# Patient Record
Sex: MALE | Race: WHITE | NOT HISPANIC OR LATINO | Employment: FULL TIME | URBAN - NONMETROPOLITAN AREA
[De-identification: names, ages, dates, MRNs, and addresses within clinical notes are randomized per-mention and may not be internally consistent; named-entity substitution may affect disease eponyms.]

---

## 2017-04-05 ENCOUNTER — OFFICE VISIT (OUTPATIENT)
Dept: FAMILY MEDICINE | Facility: OTHER | Age: 37
End: 2017-04-05
Attending: FAMILY MEDICINE
Payer: COMMERCIAL

## 2017-04-05 VITALS
SYSTOLIC BLOOD PRESSURE: 110 MMHG | HEIGHT: 76 IN | TEMPERATURE: 98.6 F | OXYGEN SATURATION: 99 % | DIASTOLIC BLOOD PRESSURE: 58 MMHG | WEIGHT: 233 LBS | RESPIRATION RATE: 16 BRPM | BODY MASS INDEX: 28.37 KG/M2 | HEART RATE: 80 BPM

## 2017-04-05 DIAGNOSIS — B08.4 HAND, FOOT AND MOUTH DISEASE: ICD-10-CM

## 2017-04-05 DIAGNOSIS — J01.00 ACUTE MAXILLARY SINUSITIS, RECURRENCE NOT SPECIFIED: Primary | ICD-10-CM

## 2017-04-05 DIAGNOSIS — Z71.89 ACP (ADVANCE CARE PLANNING): Chronic | ICD-10-CM

## 2017-04-05 PROCEDURE — 99213 OFFICE O/P EST LOW 20 MIN: CPT | Performed by: FAMILY MEDICINE

## 2017-04-05 ASSESSMENT — PAIN SCALES - GENERAL: PAINLEVEL: NO PAIN (0)

## 2017-04-05 NOTE — PROGRESS NOTES
Abner Marshall    April 5, 2017    Chief Complaint   Patient presents with     URI     cough, headache, sinus pressure, sore throat, chills, body aches since last Tuesday and has had a fever off and on      *_* Health Care Directive *_*     paperwork declined        SUBJECTIVE:  Here for ongoing URI.  Had hand foot mouth, got better, but having worsening URI sx and now bloody purulent mucous and rhinorrhea.  Prominent cough.      History reviewed. No pertinent past medical history.    Past Surgical History:   Procedure Laterality Date     VASECTOMY  2011       Current Outpatient Prescriptions   Medication Sig Dispense Refill     Loratadine (CLARITIN PO) Take 1 tablet by mouth daily       amoxicillin-clavulanate (AUGMENTIN) 875-125 MG per tablet Take 1 tablet by mouth 2 times daily 20 tablet 0       Allergies   Allergen Reactions     Mold      Seasonal Allergies        History reviewed. No pertinent family history.    Social History     Social History     Marital status:      Spouse name: N/A     Number of children: N/A     Years of education: N/A     Occupational History     Not on file.     Social History Main Topics     Smoking status: Never Smoker     Smokeless tobacco: Never Used     Alcohol use Not on file     Drug use: Not on file     Sexual activity: Not on file     Other Topics Concern     Not on file     Social History Narrative     No narrative on file       5 point ROS negative except as noted above in HPI, including Gen., Resp., CV, GI &  system review.     OBJECTIVE:  B/P: 110/58, T: 98.6, P: 80, R: 16    GENERAL APPEARANCE: Alert, no acute distress  HEENT:  Normal entirely.    CV: regular rate and rhythm, no murmur, rub or gallop  RESP: lungs clear to auscultation bilaterally  ABDOMEN: normal bowel sounds, soft, nontender, no hepatosplenomegaly or other masses  SKIN: no suspicious lesions or rashes to visualized skin  NEURO: Alert, oriented x 3, speech and mentation normal    ASSESSMENT and  PLAN:  (J01.00) Acute maxillary sinusitis, recurrence not specified  (primary encounter diagnosis)  Comment: with now bloody rhinorrhea, purulent.   Plan: amoxicillin-clavulanate (AUGMENTIN) 875-125 MG         per tablet        tx with augmentin and f/u with ongoing concerns.     (B08.4) Hand, foot and mouth disease  Comment: resolved.    Plan: follow.

## 2017-04-05 NOTE — MR AVS SNAPSHOT
"              After Visit Summary   2017    Abner Marshall    MRN: [id number]           Patient Information     Date Of Birth          1980        Visit Information        Provider Department      2017 9:30 AM Brad Houser MD [facility]        Today's Diagnoses     Acute maxillary sinusitis, recurrence not specified    -  1    Hand, foot and mouth disease        ACP (advance care planning)          Care Instructions    F/u with ongoing concerns        Follow-ups after your visit        Who to contact     If you have questions or need follow up information about today's clinic visit or your schedule please contact [facility] directly at [phone].  Normal or non-critical lab and imaging results will be communicated to you by MyChart, letter or phone within 4 business days after the clinic has received the results. If you do not hear from us within 7 days, please contact the clinic through MyChart or phone. If you have a critical or abnormal lab result, we will notify you by phone as soon as possible.  Submit refill requests through [employer] or call your pharmacy and they will forward the refill request to us. Please allow 3 business days for your refill to be completed.          Additional Information About Your Visit        MyChart Information     [employer] lets you send messages to your doctor, view your test results, renew your prescriptions, schedule appointments and more. To sign up, go to [email or web address] . Click on \"Log in\" on the left side of the screen, which will take you to the Welcome page. Then click on \"Sign up Now\" on the right side of the page.     You will be asked to enter the access code listed below, as well as some personal information. Please follow the directions to create your username and password.     Your access code is: [email or web address]  Expires: 2017  9:42 AM     Your access code will  in 90 days. If you need help or a new " "code, please call your [facility] clinic or [phone].        Care EveryWhere ID     This is your Care EveryWhere ID. This could be used by other organizations to access your [facility] medical records  [id number]        Your Vitals Were     Pulse Temperature Respirations Height Pulse Oximetry BMI (Body Mass Index)    80 98.6  F (37  C) (Tympanic) 16 6' 3.5\" (1.918 m) 99% 28.74 kg/m2       Blood Pressure from Last 3 Encounters:   04/05/17 110/58    Weight from Last 3 Encounters:   04/05/17 233 lb (105.7 kg)              Today, you had the following     No orders found for display         Today's Medication Changes          These changes are accurate as of: 4/5/17  9:42 AM.  If you have any questions, ask your nurse or doctor.               Start taking these medicines.        Dose/Directions    amoxicillin-clavulanate 875-125 MG per tablet   Commonly known as:  AUGMENTIN   Used for:  Acute maxillary sinusitis, recurrence not specified   Started by:  Brad Houser MD        Dose:  1 tablet   Take 1 tablet by mouth 2 times daily   Quantity:  20 tablet   Refills:  0            Where to get your medicines      These medications were sent to [id number] - [place], [place] - [address] [address] [id number] [address], [place] [address]     Phone:  [phone]     amoxicillin-clavulanate 875-125 MG per tablet                Primary Care Provider    None Specified       No primary provider on file.        Thank you!     Thank you for choosing [facility]  for your care. Our goal is always to provide you with excellent care. Hearing back from our patients is one way we can continue to improve our services. Please take a few minutes to complete the written survey that you may receive in the mail after your visit with us. Thank you!             Your Updated Medication List - Protect others around you: Learn how to safely use, store and throw away your medicines at www.disposemymeds.org.          This list is " accurate as of: 4/5/17  9:42 AM.  Always use your most recent med list.                   Brand Name Dispense Instructions for use    amoxicillin-clavulanate 875-125 MG per tablet    AUGMENTIN    20 tablet    Take 1 tablet by mouth 2 times daily       CLARITIN PO      Take 1 tablet by mouth daily

## 2017-04-05 NOTE — NURSING NOTE
"Chief Complaint   Patient presents with     URI     cough, headache, sinus pressure, sore throat, chills, body aches since last Tuesday and has had a fever off and on      *_* Health Care Directive *_*     paperwork declined        Initial /58 (BP Location: Right arm, Patient Position: Chair, Cuff Size: Adult Large)  Pulse 80  Temp 98.6  F (37  C) (Tympanic)  Resp 16  Ht 6' 3.5\" (1.918 m)  Wt 233 lb (105.7 kg)  SpO2 99%  BMI 28.74 kg/m2 Estimated body mass index is 28.74 kg/(m^2) as calculated from the following:    Height as of this encounter: 6' 3.5\" (1.918 m).    Weight as of this encounter: 233 lb (105.7 kg).  Medication Reconciliation: complete   Roma Pena LPN      "

## 2019-02-05 NOTE — PROGRESS NOTES
SUBJECTIVE:   Abner Marshall is a 38 year old male who presents to clinic today for the following health issues:      RESPIRATORY SYMPTOMS;   Sinus Problem      Duration: 2 months    Description  nasal congestion, facial pain/pressure, ear pain left, headache, hoarse voice, nausea and loss of appetite, Ocean sound in ears    Severity: moderate    Accompanying signs and symptoms: see above    History (predisposing factors):  none    Precipitating or alleviating factors: None    Therapies tried and outcome:  rest and fluids antihistamine ; Claritin D; helpful not effective.             Problem list and histories reviewed & adjusted, as indicated.  Additional history: as documented    Patient Active Problem List   Diagnosis     ACP (advance care planning)     Past Surgical History:   Procedure Laterality Date     VASECTOMY  2011       Social History     Tobacco Use     Smoking status: Never Smoker     Smokeless tobacco: Never Used   Substance Use Topics     Alcohol use: No     Family History   Problem Relation Age of Onset     Obesity Mother      Ear Disorder Mother         Chronic ear disease         Current Outpatient Medications   Medication Sig Dispense Refill     amoxicillin-clavulanate (AUGMENTIN) 875-125 MG tablet Take 1 tablet by mouth 2 times daily for 14 days 28 tablet 0     Loratadine (CLARITIN PO) Take 1 tablet by mouth daily       Allergies   Allergen Reactions     Mold      Seasonal Allergies        Reviewed and updated as needed this visit by clinical staff  Tobacco  Allergies  Meds  Med Hx  Surg Hx  Fam Hx  Soc Hx      Reviewed and updated as needed this visit by Provider         ROS:  Constitutional, HEENT, cardiovascular, pulmonary, gi and gu systems are negative, except as otherwise noted.    OBJECTIVE:                                                    BP 98/58 (BP Location: Right arm, Patient Position: Sitting, Cuff Size: Adult Large)   Pulse 70   Temp 97.6  F (36.4  C) (Tympanic)   Ht  "1.905 m (6' 3\")   Wt 108 kg (238 lb)   SpO2 97%   BMI 29.75 kg/m    Body mass index is 29.75 kg/m .  General: Alert, oriented. In NAD  Skin: warm and dry. No suspicious rash, lesions.  HEENT: EAC's and TM's intact. Posterior pharynx moist and pink without edema or exudate. Nasal mucosa edematous, erythematous. Maxillary sinuses TTP.Neck is supple. No lymphadenopathy.  Resp: Lungs CTA without wheeze, rale or rhonchi.  Cardiac: RRR. Normal S1, S2. No murmur.  Psych. Mood euthymic with corresponding affect           ASSESSMENT/PLAN:                                                    1. Sinusitis, unspecified chronicity, unspecified location  - amoxicillin-clavulanate (AUGMENTIN) 875-125 MG tablet; Take 1 tablet by mouth 2 times daily for 14 days  Dispense: 28 tablet; Refill: 0    2. Need for diphtheria-tetanus-pertussis (Tdap) vaccine  - TDAP VACCINE (ADACEL)  - VACCINE ADMINISTRATION, INITIAL      Rest, increase fluids, Tylenol for fever or discomfort. Return to clinic if symptoms persist or worsen.      JIM Urbina  [facility]  "

## 2019-02-06 ENCOUNTER — OFFICE VISIT (OUTPATIENT)
Dept: FAMILY MEDICINE | Facility: OTHER | Age: 39
End: 2019-02-06
Attending: PHYSICIAN ASSISTANT

## 2019-02-06 VITALS
HEIGHT: 75 IN | SYSTOLIC BLOOD PRESSURE: 98 MMHG | WEIGHT: 238 LBS | TEMPERATURE: 97.6 F | BODY MASS INDEX: 29.59 KG/M2 | HEART RATE: 70 BPM | DIASTOLIC BLOOD PRESSURE: 58 MMHG | OXYGEN SATURATION: 97 %

## 2019-02-06 DIAGNOSIS — J32.9 SINUSITIS, UNSPECIFIED CHRONICITY, UNSPECIFIED LOCATION: ICD-10-CM

## 2019-02-06 DIAGNOSIS — Z23 NEED FOR DIPHTHERIA-TETANUS-PERTUSSIS (TDAP) VACCINE: Primary | ICD-10-CM

## 2019-02-06 PROCEDURE — 99213 OFFICE O/P EST LOW 20 MIN: CPT | Mod: 25 | Performed by: PHYSICIAN ASSISTANT

## 2019-02-06 PROCEDURE — 90471 IMMUNIZATION ADMIN: CPT | Performed by: PHYSICIAN ASSISTANT

## 2019-02-06 PROCEDURE — 90715 TDAP VACCINE 7 YRS/> IM: CPT | Performed by: PHYSICIAN ASSISTANT

## 2019-02-06 ASSESSMENT — MIFFLIN-ST. JEOR: SCORE: 2095.19

## 2019-02-06 ASSESSMENT — PAIN SCALES - GENERAL: PAINLEVEL: MODERATE PAIN (4)

## 2019-02-06 NOTE — NURSING NOTE
"Chief Complaint   Patient presents with     URI     Sinus       Initial BP 98/58 (BP Location: Right arm, Patient Position: Sitting, Cuff Size: Adult Large)   Pulse 70   Temp 97.6  F (36.4  C) (Tympanic)   Ht 1.905 m (6' 3\")   Wt 108 kg (238 lb)   SpO2 97%   BMI 29.75 kg/m   Estimated body mass index is 29.75 kg/m  as calculated from the following:    Height as of this encounter: 1.905 m (6' 3\").    Weight as of this encounter: 108 kg (238 lb).  Medication Reconciliation: complete    Paulina Morfin LPN  "

## 2019-10-28 ENCOUNTER — HOSPITAL ENCOUNTER (EMERGENCY)
Facility: HOSPITAL | Age: 39
Discharge: HOME | End: 2019-10-28
Attending: PHYSICIAN ASSISTANT | Admitting: PHYSICIAN ASSISTANT
Payer: COMMERCIAL

## 2019-10-28 ENCOUNTER — APPOINTMENT (OUTPATIENT)
Dept: GENERAL RADIOLOGY | Facility: HOSPITAL | Age: 39
End: 2019-10-28
Attending: PHYSICIAN ASSISTANT
Payer: COMMERCIAL

## 2019-10-28 VITALS
OXYGEN SATURATION: 98 % | RESPIRATION RATE: 18 BRPM | SYSTOLIC BLOOD PRESSURE: 133 MMHG | WEIGHT: 230 LBS | BODY MASS INDEX: 28.75 KG/M2 | TEMPERATURE: 98.3 F | DIASTOLIC BLOOD PRESSURE: 82 MMHG

## 2019-10-28 DIAGNOSIS — S02.2XXA CLOSED FRACTURE OF NASAL BONE, INITIAL ENCOUNTER: Primary | ICD-10-CM

## 2019-10-28 PROCEDURE — 70150 X-RAY EXAM OF FACIAL BONES: CPT | Mod: TC

## 2019-10-28 PROCEDURE — G0463 HOSPITAL OUTPT CLINIC VISIT: HCPCS

## 2019-10-28 PROCEDURE — 99213 OFFICE O/P EST LOW 20 MIN: CPT | Mod: Z6 | Performed by: PHYSICIAN ASSISTANT

## 2019-10-28 ASSESSMENT — ENCOUNTER SYMPTOMS
SINUS PAIN: 0
DIARRHEA: 0
SORE THROAT: 0
VOMITING: 0
NECK PAIN: 0
FEVER: 0
NAUSEA: 0

## 2019-10-28 NOTE — ED TRIAGE NOTES
Pt presents with a reddened,swollen and painful nose since today when he got hit in the nose with a log.

## 2019-10-28 NOTE — ED PROVIDER NOTES
History     Chief Complaint   Patient presents with     Facial Injury     HPI  Abner Marshall is a 39 year old male who presents to urgent care for evaluation of nose.  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  He states that he noted a disfigurement of his nose and bleeding immediately.  Patient denies any loss of consciousness, malalignment of teeth, vision changes, nausea, vomiting, hearing changes or any other associated symptom.  Patient has not taken anything over-the-counter.    Allergies:  Allergies   Allergen Reactions     Mold      Seasonal Allergies        Problem List:    Patient Active Problem List    Diagnosis Date Noted     ACP (advance care planning) 04/05/2017     Priority: Medium     Advance Care Planning 4/5/2017: ACP Review of Chart / Resources Provided:  Reviewed chart for advance care plan.  Abner Marshall has no plan or code status on file. Discussed available resources and provided with information.   Added by Roma Pena                Past Medical History:    No past medical history on file.    Past Surgical History:    Past Surgical History:   Procedure Laterality Date     VASECTOMY  2011       Family History:    Family History   Problem Relation Age of Onset     Obesity Mother      Ear Disorder Mother         Chronic ear disease       Social History:  Marital Status:   [2]  Social History     Tobacco Use     Smoking status: Never Smoker     Smokeless tobacco: Never Used   Substance Use Topics     Alcohol use: No     Drug use: No        Medications:    Loratadine (CLARITIN PO)          Review of Systems   Constitutional: Negative for fever.   HENT: Positive for nosebleeds. Negative for sinus pain, sneezing and sore throat.    Eyes: Negative for visual disturbance.   Gastrointestinal: Negative for diarrhea, nausea and vomiting.   Musculoskeletal: Negative for neck pain.       Physical Exam   BP: 133/82  Heart  Rate: 67  Temp: 98.3  F (36.8  C)  Resp: 18  Weight: 104.3 kg (230 lb)  SpO2: 98 %      Physical Exam  Vitals signs and nursing note reviewed.   Constitutional:       Appearance: He is not ill-appearing.   HENT:      Head: Normocephalic.      Right Ear: Tympanic membrane normal.      Left Ear: Tympanic membrane normal.      Nose: Septal deviation and nasal tenderness present.      Right Nostril: Epistaxis present. No septal hematoma.      Left Nostril: No septal hematoma.      Right Sinus: No maxillary sinus tenderness or frontal sinus tenderness.      Left Sinus: No maxillary sinus tenderness or frontal sinus tenderness.   Eyes:      Extraocular Movements: Extraocular movements intact.      Conjunctiva/sclera: Conjunctivae normal.      Pupils: Pupils are equal, round, and reactive to light.   Cardiovascular:      Rate and Rhythm: Regular rhythm.      Heart sounds: Normal heart sounds.   Pulmonary:      Breath sounds: Normal breath sounds.   Neurological:      Mental Status: He is alert.         ED Course        Procedures              Critical Care time:               Results for orders placed or performed during the hospital encounter of 10/28/19 (from the past 24 hour(s))   XR Facial Bone G/E 3 Views    Narrative    PROCEDURE: XR FACIAL BONE G/E 3 VW 10/28/2019 2:25 PM    HISTORY: trauma to nose    COMPARISONS: None.    TECHNIQUE: 3 views    FINDINGS: There is a nasal bone fracture seen. The fracture does not  appear significantly displaced. There is deviation of the nasal septum  to the left. The paranasal sinuses are clear. The maxillary spine is  intact.         Impression    IMPRESSION: Nasal bone fracture    LARS PALACIOS MD       Medications - No data to display    Assessments & Plan (with Medical Decision Making)   #1.  Nasal fracture septal deviation    Discussed exam findings with patient.  Patient has referral to ENT placed.  Appropriate icing along with rotating Tylenol and ibuprofen as discussed  with patient.  We also discussed limiting activity over the next few days to decrease chance of recurrent epistaxis.  Any concerns in the meantime please return to urgency department.  Patient verbalizes understanding and agreement of plan.        I have reviewed the nursing notes.    I have reviewed the findings, diagnosis, plan and need for follow up with the patient.      Discharge Medication List as of 10/28/2019  3:15 PM          Final diagnoses:   Closed fracture of nasal bone, initial encounter       10/28/2019   [facility]     Godfrey Bronson PA-C  10/28/19 [id number]

## 2019-10-29 NOTE — PROGRESS NOTES
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  He noticed bleeding and a change in appearance of the nose    He denies prior nasal surgery or prior nasal trauma     He was seen in the emergency room by VICTORIA FERNANDEZ who contacted me and is here for consultation      There is no CT facial bones but There Is an X-Ray of the Facial Bones Which Was Reviewed and Shows a Comminuted Nasal Bone Fracture with Slight Displacement and a Septal Deviation to the Left      He feels he is starting to breathe better out of his nose today.  No complaints of nasal obstruction no further epistaxis he denies pain    He also has noticed over the past year that his allergies seem to be progressively worsening.  His symptoms include rhinorrhea associated with eye drainage, ocular pruritus and recurrent sneezing.  He denies chronic sinusitis or chronic congestion.    He does snore but denies carmen sleep apnea or chronic daytime somnolence.  His wife Flakita does have concerns regarding hypersomnolence and heroic snoring  No history of heart disease hypertension no family history of significant heart disease early death or stroke  There is a significant family history of allergies and snoring    Current Outpatient Rx   Medication Sig Dispense Refill     Loratadine (CLARITIN PO) Take 1 tablet by mouth daily         Allergies: Mold and Seasonal allergies     History reviewed. No pertinent past medical history.    Past Surgical History:   Procedure Laterality Date     VASECTOMY         ENT family history reviewed    Social History     Tobacco Use     Smoking status: Never Smoker     Smokeless tobacco: Never Used   Substance Use Topics     Alcohol use: No      "Drug use: No       Review of Systems  ROS: 10 point ROS neg other than the symptoms noted above in the HPI and occasional imbalance    Physical Exam  /72   Pulse 82   Temp 96.8  F (36  C) (Tympanic)   Ht 1.905 m (6' 3\")   Wt 104.3 kg (230 lb)   SpO2 97%   BMI 28.75 kg/m    General - The patient is well nourished and well developed, and appears to have good nutritional status.  Alert and oriented to person and place, answers questions and cooperates with examination appropriately.   No telecanthus.  Jaw opening is adequate no palpable orbital step-off fracture  Head and Face - Normocephalic and atraumatic, with no gross asymmetry noted.  The facial nerve is intact, with strong symmetric movements.  Voice and Breathing - The patient was breathing comfortably without the use of accessory muscles. There was no wheezing, stridor, or stertor.  The patients voice was clear and strong, and had appropriate pitch and quality.  No carmen peripheral digital clubbing or cyanosis   Ears -The external auditory canals are patent, the tympanic membranes are intact without effusion, retraction or mass.  Bony landmarks are intact.  Eyes - Extraocular movements intact, and the pupils were reactive to light.  Sclera were not icteric or injected, conjunctiva were pink and moist.  Mouth - Examination of the oral cavity showed pink, healthy oral mucosa. No lesions or ulcerations noted.  The tongue was mobile and midline, and the dentition were in good condition.    Throat - The walls of the oropharynx were smooth, pink, moist, symmetric, and had no lesions or ulcerations.  The tonsillar pillars and soft palate were symmetric.  The uvula was midline on elevation.  Akhtar Tongue Position III, grade 2 tonsils, small pinpoint uvular papilloma on the left  Neck - No palpable enlarged fixed cervical lymph nodes.  No neck cysts or unusual tenderness to palpation.   No palpable fixed thyroid nodules or concerning goiter.  The trachea " is grossly midline.   Nose - External contour is asymmetric with a palpable and visible asymmetry at the cephalad nasal bone to the left.   no scars.  Nasal mucosa is pink and moist with no abnormal mucus.  Tip grossly symmetric.  Photos taken. The septum and turbinates were evaluated: DNS left with 60% obstruction, moderate  inferior turbinate hypertrophy bilaterally.  No polyps, masses, or purulence noted on examination.    To evaluate the nose and sinuses, I performed rigid nasal endoscopy. The LPN had previously sprayed both nares with lidocaine and neosynephrine.    I began with the LEFT side using a 0 degree rigid nasal endoscope, and then similarly examined the RIGHT side    Findings:  Inferior turbinates:  edematous  Middle turbinate and middle meatus:  No purulence, no polyposis  Mucosa is healthy throughout,  no polyps nor polypoid degeneration  DNS left mid to superior septum   no septal hematoma no clear rhinorrhea  The patient tolerated the procedure well    Impression and Plan- Abner Marshall is a 39 year old male with:    ICD-10-CM    1. Perennial allergic rhinitis J30.89 fluticasone (FLONASE) 50 MCG/ACT nasal spray     desloratadine (CLARINEX) 5 MG tablet   2. Closed fracture of nasal bone, initial encounter S02.2XXA    3. DNS (deviated nasal septum) J34.2    4. Nasal turbinate hypertrophy J34.3    5. Primary snoring R06.83    6. 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  He only is concerned that he is breathing out of his nose and he feels he is breathing well.  I did discuss close reduction nasal bone fracture with turbinate reduction.  At this point he is not interested in surgery and there is no absolute indication for nasal surgery.    His septal deviation is most likely long-standing there are no septal abrasions or ecchymosis    He can certainly proceed with office turbinate reduction in the future if  necessary this was also discussed    Symptoms of sleep apnea were discussed and  if carmen apnea develops he should certainly have a sleep study.  At this point he feels he is sleeping well there is some disagreement between he and his wife about daytime somnolence.  If he decides he would like to have a sleep study and certainly happy to order this however there is no obesity hypertension or other obvious risk factors other than his oropharynx anatomy that would lead me to order a sleep study at this juncture    Complete Allergy Skin Testing  Start Clarinex (depending on insurance)  Stop Claritin  Start Flonase in 1 week  Use ocean spray until that time    Indications for allergy testing include:   1) Confirm suspicion of allergic rhinitis due to inhalant allergies  2) Identify the offending allergen to determine specific mode of treatment  3) In the case of chronic rhinosinusitis: when symptoms are not controlled by avoidance and pharmacotherapy  4) In the Asthma patient when exacerbations may be due to perennial allergen exposure  5) Suspect food allergy  6) Otitis Media, chronic rhinitis, atopic dermatitis, Meniere disease, headache, pharyngitis or eye symptoms    Modified quantitative testing (MQT) will be performed.  Signed consent was obtained, and the risks of immunotherapy were discussed, including the potential for anaphylaxis.    If immunotherapy (IT) is recommended, there is continued risk of anaphylaxis.   Anaphylaxis can cause death. The patient will need to be monitored for 30 minutes post injection.  They must present their epinephrine pen prior to injection.  Subcutaneous as well as sublingual immunotherapy (SLIT) were discussed as potential treatment options.  The patient was told SLIT is not approved by the FDA and is cash pay.  The general time frame of immunotherapy was discussed (generally 3-5 years, sometimes longer), and the basic immunology behind IT was discussed.        Iris LOUISE  DARIAN Simpson.  Otolaryngology/Head and Neck Surgery  Allergy

## 2019-10-30 ENCOUNTER — OFFICE VISIT (OUTPATIENT)
Dept: OTOLARYNGOLOGY | Facility: OTHER | Age: 39
End: 2019-10-30
Attending: OTOLARYNGOLOGY
Payer: COMMERCIAL

## 2019-10-30 VITALS
TEMPERATURE: 96.8 F | SYSTOLIC BLOOD PRESSURE: 110 MMHG | HEIGHT: 75 IN | HEART RATE: 82 BPM | OXYGEN SATURATION: 97 % | WEIGHT: 230 LBS | DIASTOLIC BLOOD PRESSURE: 72 MMHG | BODY MASS INDEX: 28.6 KG/M2

## 2019-10-30 DIAGNOSIS — R06.83 PRIMARY SNORING: ICD-10-CM

## 2019-10-30 DIAGNOSIS — J34.2 DNS (DEVIATED NASAL SEPTUM): ICD-10-CM

## 2019-10-30 DIAGNOSIS — J34.3 NASAL TURBINATE HYPERTROPHY: ICD-10-CM

## 2019-10-30 DIAGNOSIS — S02.2XXA CLOSED FRACTURE OF NASAL BONE, INITIAL ENCOUNTER: ICD-10-CM

## 2019-10-30 DIAGNOSIS — J30.89 PERENNIAL ALLERGIC RHINITIS: Primary | ICD-10-CM

## 2019-10-30 DIAGNOSIS — K13.79 HYPERTROPHIC SOFT PALATE: ICD-10-CM

## 2019-10-30 PROCEDURE — 99204 OFFICE O/P NEW MOD 45 MIN: CPT | Mod: 25 | Performed by: OTOLARYNGOLOGY

## 2019-10-30 PROCEDURE — 31231 NASAL ENDOSCOPY DX: CPT | Performed by: OTOLARYNGOLOGY

## 2019-10-30 RX ORDER — DESLORATADINE 5 MG/1
5 TABLET ORAL DAILY
Qty: 60 TABLET | Refills: 12 | Status: SHIPPED | OUTPATIENT
Start: 2019-10-30 | End: 2020-10-22

## 2019-10-30 RX ORDER — FLUTICASONE PROPIONATE 50 MCG
2 SPRAY, SUSPENSION (ML) NASAL DAILY
Qty: 16 G | Refills: 12 | Status: SHIPPED | OUTPATIENT
Start: 2019-10-30 | End: 2020-01-07

## 2019-10-30 ASSESSMENT — PAIN SCALES - GENERAL: PAINLEVEL: NO PAIN (0)

## 2019-10-30 ASSESSMENT — MIFFLIN-ST. JEOR: SCORE: 2053.9

## 2019-10-30 NOTE — PATIENT INSTRUCTIONS
Thank you for allowing Dr. Simpson and our ENT team to participate in your care.  If your medications are too expensive, please give the nurse a call.  We can possibly change this medication.  If you have a scheduling or an appointment question please contact our Health Unit Coordinator at their direct line [phone].   ALL nursing questions or concerns can be directed to your ENT nurse at: [phone] - Stephanie    Complete Allergy Skin Testing  Start Clarinex (depending on insurance)  Stop Claritin  Start Flonase in 1 week    Indications for allergy testing include:   1) Confirm suspicion of allergic rhinitis due to inhalant allergies  2) Identify the offending allergen to determine specific mode of treatment  3) In the case of chronic rhinosinusitis: when symptoms are not controlled by avoidance and pharmacotherapy  4) In the Asthma patient when exacerbations may be due to perennial allergen exposure  5) Suspect food allergy  6) Otitis Media, chronic rhinitis, atopic dermatitis, Meniere disease, headache, pharyngitis or eye symptoms    Modified quantitative testing (MQT) will be performed.  Signed consent was obtained, and the risks of immunotherapy were discussed, including the potential for anaphylaxis.    If immunotherapy (IT) is recommended, there is continued risk of anaphylaxis.   Anaphylaxis can cause death. The patient will need to be monitored for 30 minutes post injection.  They must present their epinephrine pen prior to injection.  Subcutaneous as well as sublingual immunotherapy (SLIT) were discussed as potential treatment options.  The patient was told SLIT is not approved by the FDA and is cash pay.  The general time frame of immunotherapy was discussed (generally 3-5 years, sometimes longer), and the basic immunology behind IT was discussed.

## 2019-10-30 NOTE — NURSING NOTE
"Chief Complaint   Patient presents with     Consult     Nasal Fracture; Referred by Caio Bronson in the ER       Initial /72   Pulse 82   Temp 96.8  F (36  C) (Tympanic)   Ht 1.905 m (6' 3\")   Wt 104.3 kg (230 lb)   SpO2 97%   BMI 28.75 kg/m   Estimated body mass index is 28.75 kg/m  as calculated from the following:    Height as of this encounter: 1.905 m (6' 3\").    Weight as of this encounter: 104.3 kg (230 lb).  Medication Reconciliation: complete  Myrna Ferrer LPN  "

## 2019-10-30 NOTE — LETTER
10/30/2019         RE: Abner Marshall  [address]  [facility] [address]        Dear Colleague,    Thank you for referring your patient, Abner Marshall, to the [facility]. Please see a copy of my visit note below.    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  He noticed bleeding and a change in appearance of the nose    He denies prior nasal surgery or prior nasal trauma     He was seen in the emergency room by VICTORIA FERNANDEZ who contacted me and is here for consultation      There is no CT facial bones but There Is an X-Ray of the Facial Bones Which Was Reviewed and Shows a Comminuted Nasal Bone Fracture with Slight Displacement and a Septal Deviation to the Left      He feels he is starting to breathe better out of his nose today.  No complaints of nasal obstruction no further epistaxis he denies pain    He also has noticed over the past year that his allergies seem to be progressively worsening.  His symptoms include rhinorrhea associated with eye drainage, ocular pruritus and recurrent sneezing.  He denies chronic sinusitis or chronic congestion.    He does snore but denies carmen sleep apnea or chronic daytime somnolence.  His wife Flakita does have concerns regarding hypersomnolence and heroic snoring  No history of heart disease hypertension no family history of significant heart disease early death or stroke  There is a significant family history of allergies and snoring    Current Outpatient Rx   Medication Sig Dispense Refill     Loratadine (CLARITIN PO) Take 1 tablet by mouth daily         Allergies: Mold and Seasonal allergies     History reviewed. No pertinent past medical  "history.    Past Surgical History:   Procedure Laterality Date     VASECTOMY  2011       ENT family history reviewed    Social History     Tobacco Use     Smoking status: Never Smoker     Smokeless tobacco: Never Used   Substance Use Topics     Alcohol use: No     Drug use: No       Review of Systems  ROS: 10 point ROS neg other than the symptoms noted above in the HPI and occasional imbalance    Physical Exam  /72   Pulse 82   Temp 96.8  F (36  C) (Tympanic)   Ht 1.905 m (6' 3\")   Wt 104.3 kg (230 lb)   SpO2 97%   BMI 28.75 kg/m     General - The patient is well nourished and well developed, and appears to have good nutritional status.  Alert and oriented to person and place, answers questions and cooperates with examination appropriately.   No telecanthus.  Jaw opening is adequate no palpable orbital step-off fracture  Head and Face - Normocephalic and atraumatic, with no gross asymmetry noted.  The facial nerve is intact, with strong symmetric movements.  Voice and Breathing - The patient was breathing comfortably without the use of accessory muscles. There was no wheezing, stridor, or stertor.  The patients voice was clear and strong, and had appropriate pitch and quality.  No carmen peripheral digital clubbing or cyanosis   Ears -The external auditory canals are patent, the tympanic membranes are intact without effusion, retraction or mass.  Bony landmarks are intact.  Eyes - Extraocular movements intact, and the pupils were reactive to light.  Sclera were not icteric or injected, conjunctiva were pink and moist.  Mouth - Examination of the oral cavity showed pink, healthy oral mucosa. No lesions or ulcerations noted.  The tongue was mobile and midline, and the dentition were in good condition.    Throat - The walls of the oropharynx were smooth, pink, moist, symmetric, and had no lesions or ulcerations.  The tonsillar pillars and soft palate were symmetric.  The uvula was midline on elevation.  " Akhtar Tongue Position III, grade 2 tonsils, small pinpoint uvular papilloma on the left  Neck - No palpable enlarged fixed cervical lymph nodes.  No neck cysts or unusual tenderness to palpation.   No palpable fixed thyroid nodules or concerning goiter.  The trachea is grossly midline.   Nose - External contour is asymmetric with a palpable and visible asymmetry at the cephalad nasal bone to the left.   no scars.  Nasal mucosa is pink and moist with no abnormal mucus.  Tip grossly symmetric.  Photos taken. The septum and turbinates were evaluated: DNS left with 60% obstruction, moderate  inferior turbinate hypertrophy bilaterally.  No polyps, masses, or purulence noted on examination.    To evaluate the nose and sinuses, I performed rigid nasal endoscopy. The LPN had previously sprayed both nares with lidocaine and neosynephrine.    I began with the LEFT side using a 0 degree rigid nasal endoscope, and then similarly examined the RIGHT side    Findings:  Inferior turbinates:  edematous  Middle turbinate and middle meatus:  No purulence, no polyposis  Mucosa is healthy throughout,  no polyps nor polypoid degeneration  DNS left mid to superior septum   no septal hematoma no clear rhinorrhea  The patient tolerated the procedure well    Impression and Plan- Abner Marshall is a 39 year old male with:    ICD-10-CM    1. Perennial allergic rhinitis J30.89 fluticasone (FLONASE) 50 MCG/ACT nasal spray     desloratadine (CLARINEX) 5 MG tablet   2. Closed fracture of nasal bone, initial encounter S02.2XXA    3. DNS (deviated nasal septum) J34.2    4. Nasal turbinate hypertrophy J34.3    5. Primary snoring R06.83    6. 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  He only is concerned that he is breathing out of his nose and he feels he is breathing well.  I did discuss close reduction nasal bone fracture with turbinate reduction.  At this point  he is not interested in surgery and there is no absolute indication for nasal surgery.    His septal deviation is most likely long-standing there are no septal abrasions or ecchymosis    He can certainly proceed with office turbinate reduction in the future if necessary this was also discussed    Symptoms of sleep apnea were discussed and  if carmen apnea develops he should certainly have a sleep study.  At this point he feels he is sleeping well there is some disagreement between he and his wife about daytime somnolence.  If he decides he would like to have a sleep study and certainly happy to order this however there is no obesity hypertension or other obvious risk factors other than his oropharynx anatomy that would lead me to order a sleep study at this juncture    Complete Allergy Skin Testing  Start Clarinex (depending on insurance)  Stop Claritin  Start Flonase in 1 week  Use ocean spray until that time    Indications for allergy testing include:   1) Confirm suspicion of allergic rhinitis due to inhalant allergies  2) Identify the offending allergen to determine specific mode of treatment  3) In the case of chronic rhinosinusitis: when symptoms are not controlled by avoidance and pharmacotherapy  4) In the Asthma patient when exacerbations may be due to perennial allergen exposure  5) Suspect food allergy  6) Otitis Media, chronic rhinitis, atopic dermatitis, Meniere disease, headache, pharyngitis or eye symptoms    Modified quantitative testing (MQT) will be performed.  Signed consent was obtained, and the risks of immunotherapy were discussed, including the potential for anaphylaxis.    If immunotherapy (IT) is recommended, there is continued risk of anaphylaxis.   Anaphylaxis can cause death. The patient will need to be monitored for 30 minutes post injection.  They must present their epinephrine pen prior to injection.  Subcutaneous as well as sublingual immunotherapy (SLIT) were discussed as potential  treatment options.  The patient was told SLIT is not approved by the FDA and is cash pay.  The general time frame of immunotherapy was discussed (generally 3-5 years, sometimes longer), and the basic immunology behind IT was discussed.        Iris Simpson D.O.  Otolaryngology/Head and Neck Surgery  Allergy      Again, thank you for allowing me to participate in the care of your patient.        Sincerely,        Iris Simpson MD

## 2020-01-07 ENCOUNTER — OFFICE VISIT (OUTPATIENT)
Dept: ALLERGY | Facility: OTHER | Age: 40
End: 2020-01-07
Attending: PHYSICIAN ASSISTANT
Payer: COMMERCIAL

## 2020-01-07 ENCOUNTER — OFFICE VISIT (OUTPATIENT)
Dept: OTOLARYNGOLOGY | Facility: OTHER | Age: 40
End: 2020-01-07
Attending: PHYSICIAN ASSISTANT
Payer: COMMERCIAL

## 2020-01-07 VITALS
BODY MASS INDEX: 30.5 KG/M2 | TEMPERATURE: 98 F | OXYGEN SATURATION: 97 % | WEIGHT: 244 LBS | SYSTOLIC BLOOD PRESSURE: 129 MMHG | HEART RATE: 82 BPM | DIASTOLIC BLOOD PRESSURE: 74 MMHG

## 2020-01-07 DIAGNOSIS — J34.3 NASAL TURBINATE HYPERTROPHY: ICD-10-CM

## 2020-01-07 DIAGNOSIS — J30.9 ALLERGIC RHINITIS: Primary | ICD-10-CM

## 2020-01-07 DIAGNOSIS — J30.89 PERENNIAL ALLERGIC RHINITIS: Primary | ICD-10-CM

## 2020-01-07 DIAGNOSIS — R06.83 PRIMARY SNORING: ICD-10-CM

## 2020-01-07 DIAGNOSIS — J34.2 DNS (DEVIATED NASAL SEPTUM): ICD-10-CM

## 2020-01-07 PROCEDURE — 99213 OFFICE O/P EST LOW 20 MIN: CPT | Mod: 25 | Performed by: PHYSICIAN ASSISTANT

## 2020-01-07 PROCEDURE — 95004 PERQ TESTS W/ALRGNC XTRCS: CPT

## 2020-01-07 PROCEDURE — 95024 IQ TESTS W/ALLERGENIC XTRCS: CPT

## 2020-01-07 RX ORDER — DESLORATADINE 5 MG/1
5 TABLET ORAL DAILY
Qty: 90 TABLET | Refills: 3 | Status: SHIPPED | OUTPATIENT
Start: 2020-01-07 | End: 2020-10-22

## 2020-01-07 ASSESSMENT — PAIN SCALES - GENERAL: PAINLEVEL: NO PAIN (0)

## 2020-01-07 NOTE — PATIENT INSTRUCTIONS
Work on allergy avoidance.   Continue with Flonase.   Rinse 1-2 times a day as needed.   Sent in Clarinex. Take one tablet daily    If no improvement- consider allergy therapy.     Thank you for allowing Paulina Sanders PA-C and our ENT team to participate in your care.  If your medications are too expensive, please give the nurse a call.  We can possibly change this medication.  If you have a scheduling or an appointment question please contact our Health Unit Coordinator at their direct line [phone].   ALL nursing questions or concerns can be directed to your ENT nurse at: [phone] Mary

## 2020-01-07 NOTE — LETTER
1/7/2020         RE: Abner Marshall  [address]  [facility] [address]        Dear Colleague,    Thank you for referring your patient, Abner Marshall, to the [facility]. Please see a copy of my visit note below.    Chief Complaint   Patient presents with     Other     MQT allergy skin testing       Patient returns following MQT  He completed allergy testing this AM without concerns.     He also has noticed over the past year that his allergies seem to be progressively worsening.  His symptoms include rhinorrhea associated with eye drainage, ocular pruritus and recurrent sneezing.  He denies chronic sinusitis or chronic congestion.  He has been using AH with fair relief.        MQT  Dilution #6- None  Dilution #5- Grass, epicocum  Dilution #2- ragweed, thistle, birch, maple, elm, oak, landon, pine,  [place],  Alternia, Hormodendron, dust.          History reviewed. No pertinent past medical history.     Allergies   Allergen Reactions     Mold      Seasonal Allergies      Current Outpatient Medications   Medication     desloratadine (CLARINEX) 5 MG tablet     Loratadine (CLARITIN PO)     No current facility-administered medications for this visit.       ROS: 10 point ROS neg other than the symptoms noted above in the HPI.  /74 (BP Location: Right arm, Patient Position: Sitting, Cuff Size: Adult Regular)   Pulse 82   Temp 98  F (36.7  C) (Oral)   Wt 110.7 kg (244 lb)   SpO2 97%   BMI 30.50 kg/m     General - The patient is well nourished and well developed, and appears to have good nutritional status.  Alert and oriented to person and place, answers questions and cooperates with examination appropriately.   No telecanthus.  Jaw opening is adequate no palpable orbital step-off fracture  Head and Face - Normocephalic and atraumatic, with no gross asymmetry noted.  The facial nerve is intact, with strong symmetric movements.  Voice and Breathing - The patient was breathing  comfortably without the use of accessory muscles. There was no wheezing, stridor, or stertor.  The patients voice was clear and strong, and had appropriate pitch and quality.  No carmen peripheral digital clubbing or cyanosis   Ears -The external auditory canals are patent, the tympanic membranes are intact without effusion, retraction or mass.  Bony landmarks are intact.  Eyes - Extraocular movements intact, and the pupils were reactive to light.  Sclera were not icteric or injected, conjunctiva were pink and moist.  Mouth - Examination of the oral cavity showed pink, healthy oral mucosa. No lesions or ulcerations noted.  The tongue was mobile and midline, and the dentition were in good condition.    Throat - The walls of the oropharynx were smooth, pink, moist, symmetric, and had no lesions or ulcerations.  The tonsillar pillars and soft palate were symmetric.  The uvula was midline on elevation.  Akhtar Tongue Position III, grade 2 tonsils, small pinpoint uvular papilloma on the left  Neck - No palpable enlarged fixed cervical lymph nodes.  No neck cysts or unusual tenderness to palpation.   No palpable fixed thyroid nodules or concerning goiter.  The trachea is grossly midline.   Nose - External contour is asymmetric with a palpable and visible asymmetry at the cephalad nasal bone to the left.   no scars.  Nasal mucosa is pink and moist with no abnormal mucus.  Tip grossly symmetric.  Photos taken. The septum and turbinates were evaluated: DNS left with 60% obstruction, moderate  inferior turbinate hypertrophy bilaterally.  No polyps, masses, or purulence noted on examination.     Skin- Normal post test appearance.     ASSESSMENT:    ICD-10-CM    1. Perennial allergic rhinitis J30.89 desloratadine (CLARINEX) 5 MG tablet   2. Nasal turbinate hypertrophy J34.3 desloratadine (CLARINEX) 5 MG tablet   3. Primary snoring R06.83    4. DNS (deviated nasal septum) J34.2        Continue with Clarinex.   Continue with  Flonase  Work on allergy avoidance.     If no improvement- consider allergy therapy.       Paulina Sanders PA-C  [facility], [place]  [phone]      Again, thank you for allowing me to participate in the care of your patient.        Sincerely,        Paulina Sanders PA-C

## 2020-01-07 NOTE — PROGRESS NOTES
Chief Complaint   Patient presents with     Other     MQT allergy skin testing       Patient returns following MQT  He completed allergy testing this AM without concerns.     He also has noticed over the past year that his allergies seem to be progressively worsening.  His symptoms include rhinorrhea associated with eye drainage, ocular pruritus and recurrent sneezing.  He denies chronic sinusitis or chronic congestion.  He has been using AH with fair relief.        MQT  Dilution #6- None  Dilution #5- Grass, epicocum  Dilution #2- ragweed, thistle, birch, maple, elm, oak, landon, pine,  [place],  Alternia, Hormodendron, dust.          History reviewed. No pertinent past medical history.     Allergies   Allergen Reactions     Mold      Seasonal Allergies      Current Outpatient Medications   Medication     desloratadine (CLARINEX) 5 MG tablet     Loratadine (CLARITIN PO)     No current facility-administered medications for this visit.       ROS: 10 point ROS neg other than the symptoms noted above in the HPI.  /74 (BP Location: Right arm, Patient Position: Sitting, Cuff Size: Adult Regular)   Pulse 82   Temp 98  F (36.7  C) (Oral)   Wt 110.7 kg (244 lb)   SpO2 97%   BMI 30.50 kg/m    General - The patient is well nourished and well developed, and appears to have good nutritional status.  Alert and oriented to person and place, answers questions and cooperates with examination appropriately.   No telecanthus.  Jaw opening is adequate no palpable orbital step-off fracture  Head and Face - Normocephalic and atraumatic, with no gross asymmetry noted.  The facial nerve is intact, with strong symmetric movements.  Voice and Breathing - The patient was breathing comfortably without the use of accessory muscles. There was no wheezing, stridor, or stertor.  The patients voice was clear and strong, and had appropriate pitch and quality.  No carmen peripheral digital clubbing or cyanosis   Ears -The external auditory  canals are patent, the tympanic membranes are intact without effusion, retraction or mass.  Bony landmarks are intact.  Eyes - Extraocular movements intact, and the pupils were reactive to light.  Sclera were not icteric or injected, conjunctiva were pink and moist.  Mouth - Examination of the oral cavity showed pink, healthy oral mucosa. No lesions or ulcerations noted.  The tongue was mobile and midline, and the dentition were in good condition.    Throat - The walls of the oropharynx were smooth, pink, moist, symmetric, and had no lesions or ulcerations.  The tonsillar pillars and soft palate were symmetric.  The uvula was midline on elevation.  Akhtar Tongue Position III, grade 2 tonsils, small pinpoint uvular papilloma on the left  Neck - No palpable enlarged fixed cervical lymph nodes.  No neck cysts or unusual tenderness to palpation.   No palpable fixed thyroid nodules or concerning goiter.  The trachea is grossly midline.   Nose - External contour is asymmetric with a palpable and visible asymmetry at the cephalad nasal bone to the left.   no scars.  Nasal mucosa is pink and moist with no abnormal mucus.  Tip grossly symmetric.  Photos taken. The septum and turbinates were evaluated: DNS left with 60% obstruction, moderate  inferior turbinate hypertrophy bilaterally.  No polyps, masses, or purulence noted on examination.     Skin- Normal post test appearance.     ASSESSMENT:    ICD-10-CM    1. Perennial allergic rhinitis J30.89 desloratadine (CLARINEX) 5 MG tablet   2. Nasal turbinate hypertrophy J34.3 desloratadine (CLARINEX) 5 MG tablet   3. Primary snoring R06.83    4. DNS (deviated nasal septum) J34.2        Continue with Clarinex.   Continue with Flonase  Work on allergy avoidance.     If no improvement- consider allergy therapy.       Paulina Sanders PA-C  ENT  [facility], [place]  [phone]

## 2020-01-07 NOTE — NURSING NOTE
"Chief Complaint   Patient presents with     Other     MQT allergy skin testing       Initial /74 (BP Location: Right arm, Patient Position: Sitting, Cuff Size: Adult Regular)   Pulse 82   Temp 98  F (36.7  C) (Oral)   Wt 110.7 kg (244 lb)   SpO2 97%   BMI 30.50 kg/m   Estimated body mass index is 30.5 kg/m  as calculated from the following:    Height as of 10/30/19: 1.905 m (6' 3\").    Weight as of this encounter: 110.7 kg (244 lb).  Medication Reconciliation: complete     Prior to testing, the patient's identity is verified using name and date of birth.  Abner presents for allergy skin testing.      The patient's symptoms have included 9 year history of allergy symptoms, worsening over the last 2 years.  He is much worse in the spring.  He has had nasal drainage, watery eyes, and constant post nasal drainage.  He has used antihistamine and nasal sprays without relief.     The patient lives in a single family home that is 5 years old.  The [place] does  a basement[place] The patient does not suspect mold, water and/or moisture issues  in the home.  The home does  have carpet, including in the bedroom.  There is radiant in floor heat, and window air conditioning.       The family has 2 cats that occasionally come inside.  They do not sleep inside.     The patient  Denies allergy testing in the past.    All of the patients medications are reviewed prior to testing.  All appropriate medications have been stopped.         Consent is signed by the patient and signature is verified.    MQT/ID test is performed per protocol.  The patient tolerated testing well.  Benadryl cooling gel is applied to all test sites, and the 2 children's chewable Claritin are administered to the patient; both per standing orders for post test itching.    All findings are recorded on the paper flow sheet. Results are reviewed with the patient.  He is given written information regarding " allergy.      The patient will follow-up with JACEY Thomas for treatment plan.      PAU Lynch RN

## 2020-01-07 NOTE — PROGRESS NOTES
Abner was seen for allergy skin testing. Patient was seen by this nurse in conjunction with ENT provider. All encounter details are documented in ENT Provider's appointment from this same date. Please see referenced encounter for this visits documentation.   Jazmin Blackwell RN

## 2020-06-04 ENCOUNTER — TELEPHONE (OUTPATIENT)
Dept: OTOLARYNGOLOGY | Facility: OTHER | Age: 40
End: 2020-06-04

## 2020-06-04 NOTE — TELEPHONE ENCOUNTER
Received a PA from [facility] for Desloratadine 5 mg tablets. Submitted on CMM. Waiting for a response.

## 2020-06-04 NOTE — TELEPHONE ENCOUNTER
Received a call from [facility] stating that patient has to try one of the formulary mediations which are: Cetirizine tablets; Levocetirizine tablets. This PA was withdrawn at this time. If provider can state reasons why the patient can't take the formularies we can try to resubmit the PA for the Desloratadine tablets.

## 2020-06-08 ENCOUNTER — TELEPHONE (OUTPATIENT)
Dept: OTOLARYNGOLOGY | Facility: OTHER | Age: 40
End: 2020-06-08

## 2020-06-08 NOTE — TELEPHONE ENCOUNTER
Can try xyzal or zyrtec which are on formulary or can purchase Clarinex out of pocket.  Please call, if he wants one of the others I will send order.  Thanks ARSALAN

## 2020-06-08 NOTE — TELEPHONE ENCOUNTER
Desloratadine (Clarinex) 5mg    Last Written Prescription Date:  1/07/2020  Last Fill Quantity: 90 tablets,   # refills: 3  Last Office Visit: 01/07/2020  Future Office visit:       Routing refill request to provider for review/approval because:  Drug not on the [facility], [facility] or [facility] refill protocol or controlled substance

## 2020-06-11 DIAGNOSIS — J34.3 NASAL TURBINATE HYPERTROPHY: ICD-10-CM

## 2020-06-11 DIAGNOSIS — J30.89 PERENNIAL ALLERGIC RHINITIS: Primary | ICD-10-CM

## 2020-06-11 RX ORDER — LEVOCETIRIZINE DIHYDROCHLORIDE 5 MG/1
5 TABLET, FILM COATED ORAL EVERY EVENING
Qty: 90 TABLET | Refills: 3 | Status: SHIPPED | OUTPATIENT
Start: 2020-06-11 | End: 2020-10-22

## 2020-06-19 ENCOUNTER — TELEPHONE (OUTPATIENT)
Dept: OTOLARYNGOLOGY | Facility: OTHER | Age: 40
End: 2020-06-19

## 2020-06-19 NOTE — TELEPHONE ENCOUNTER
[facility] Levoc[facility] Di[facility]chloride 5 mg tablets. Submitted on CMM. Waiting for a response.

## 2020-06-20 NOTE — TELEPHONE ENCOUNTER
Received an APPROVAL from [employer] for Levocetirizine Dihydrochloride 5 mg tablets. Effective 06/19/2020 to 06/18/2021. Forms scanned to [facility].

## 2020-09-04 ENCOUNTER — HOSPITAL ENCOUNTER (EMERGENCY)
Facility: HOSPITAL | Age: 40
Discharge: HOME | End: 2020-09-04
Attending: NURSE PRACTITIONER | Admitting: NURSE PRACTITIONER
Payer: COMMERCIAL

## 2020-09-04 ENCOUNTER — NURSE TRIAGE (OUTPATIENT)
Dept: FAMILY MEDICINE | Facility: OTHER | Age: 40
End: 2020-09-04

## 2020-09-04 DIAGNOSIS — L23.7 CONTACT DERMATITIS DUE TO POISON IVY: ICD-10-CM

## 2020-09-04 PROCEDURE — 99213 OFFICE O/P EST LOW 20 MIN: CPT | Mod: Z6 | Performed by: NURSE PRACTITIONER

## 2020-09-04 PROCEDURE — G0463 HOSPITAL OUTPT CLINIC VISIT: HCPCS

## 2020-09-04 RX ORDER — PREDNISONE 20 MG/1
20 TABLET ORAL DAILY
Qty: 5 TABLET | Refills: 0 | Status: SHIPPED | OUTPATIENT
Start: 2020-09-04 | End: 2020-10-22

## 2020-09-04 ASSESSMENT — ENCOUNTER SYMPTOMS
MYALGIAS: 0
PSYCHIATRIC NEGATIVE: 1
FEVER: 1

## 2020-09-04 NOTE — TELEPHONE ENCOUNTER
Pt called reports PCP is Dr. Houser. No in person visits in clinic pt will go [facility]/ED.     Additional Information    Negative: [1] Sudden onset of rash (within last 2 hours) AND [2] difficulty with breathing or swallowing    Negative: Sounds like a life-threatening emergency to the triager    Negative: Poison ivy, oak, or sumac contact suspected    Negative: Insect bite(s) suspected    Negative: Ringworm suspected (i.e., round pink patch, sometimes looks like ring, usually 1/2 to 1 inch [12-25 mm],  in size, slowly increasing in size)    Negative: Athlete's Foot suspected (i.e., itchy rash between the toes)    Negative: Jock Itch suspected (i.e., itchy rash on inner thighs near genital area)    Negative: Wound infection suspected (i.e., pain, spreading redness, or pus; in a cut, puncture, scrape or sutured wound)    Negative: Impetigo suspected  (i.e., painless infected superficial small sores, less than 1 inch or 2.5 cm, often covered by a soft, yellow-brown scab or crust; sometimes occurring near nasal openings)    Negative: Shingles suspected (i.e., painful rash, multiple small blisters grouped together in one area of body; dermatomal distribution)    Negative: Rash of external female genital area (vulva)    Negative: Rash of penis or scrotum    Negative: Small spot, skin growth, or mole    Negative: Sores or skin ulcer, not a rash    Negative: Localized lump (or swelling) without redness or rash    Negative: [1] Localized purple or blood-colored spots or dots AND [2] not from injury or friction AND [3] fever    Negative: Patient sounds very sick or weak to the triager    Negative: [1] Red area or streak AND [2] fever    Negative: [1] Rash is painful to touch AND [2] fever    Negative: [1] Looks infected (spreading redness, pus) AND [2] large red area (> 2 in. or 5 cm)    Negative: [1] Looks infected (spreading redness, pus) AND [2] diabetes mellitus or weak immune system (e.g., HIV positive, cancer chemo,  "splenectomy, organ transplant, chronic steroids)    Negative: [1] Localized purple or blood-colored spots or dots AND [2] not from injury or friction AND [3] no fever    [1] Looks infected (spreading redness, pus) AND [2] no fever    Answer Assessment - Initial Assessment Questions  1. APPEARANCE of RASH: \"Describe the rash.\"       Red raised   2. LOCATION: \"Where is the rash located?\"       Left arm  3. NUMBER: \"How many spots are there?\"       Blotchy spots dozen  4. SIZE: \"How big are the spots?\" (Inches, centimeters or compare to size of a coin)       dime  5. ONSET: \"When did the rash start?\"       9/1/20  6. ITCHING: \"Does the rash itch?\" If so, ask: \"How bad is the itch?\"  (Scale 1-10; or mild, moderate, severe)      severe  7. PAIN: \"Does the rash hurt?\" If so, ask: \"How bad is the pain?\"  (Scale 1-10; or mild, moderate, severe)      no  8. OTHER SYMPTOMS: \"Do you have any other symptoms?\" (e.g., fever)      denies  9. PREGNANCY: \"Is there any chance you are pregnant?\" \"When was your last menstrual period?\"      n/a    Protocols used: RASH OR REDNESS - [id number]      "

## 2020-09-04 NOTE — ED TRIAGE NOTES
Patient c/o slight rash starting Tuesday morning on the left arm going up to shoulder. Wednesday it started to spread and got itchy, he took calamine lotion, took benadryl, and hydrocortisone cream on it, none of it helped it. He states it is itching like crazy. Could have been exposed to poison ivy or poison oak due to being in the woods.

## 2020-09-04 NOTE — ED PROVIDER NOTES
History     Chief Complaint   Patient presents with     Rash     c/o itchy rash lt upper arm     HPI  Abner Marshall is a 40 year old male who was pulling weeds on Tuesday and developed small raised rash to inner left upper arm.  Very itchy. No drainage. No fevers.  Has tried Benadryl and Calamine lotion.      Allergies:  Allergies   Allergen Reactions     Mold      Seasonal Allergies        Problem List:    Patient Active Problem List    Diagnosis Date Noted     ACP (advance care planning) 04/05/2017     Priority: Medium     Advance Care Planning 4/5/2017: ACP Review of Chart / Resources Provided:  Reviewed chart for advance care plan.  Abner Marshall has no plan or code status on file. Discussed available resources and provided with information.   Added by Roma Pena                Past Medical History:    No past medical history on file.    Past Surgical History:    Past Surgical History:   Procedure Laterality Date     VASECTOMY  2011       Family History:    Family History   Problem Relation Age of Onset     Obesity Mother      Ear Disorder Mother         Chronic ear disease       Social History:  Marital Status:   [2]  Social History     Tobacco Use     Smoking status: Never Smoker     Smokeless tobacco: Never Used   Substance Use Topics     Alcohol use: No     Drug use: No        Medications:    predniSONE (DELTASONE) 20 MG tablet  desloratadine (CLARINEX) 5 MG tablet  desloratadine (CLARINEX) 5 MG tablet  levocetirizine (XYZAL) 5 MG tablet  Loratadine (CLARITIN PO)          Review of Systems   Constitutional: Positive for fever.   Musculoskeletal: Negative for myalgias.   Skin: Positive for rash.        Itchy rash to inner upper arm.     Psychiatric/Behavioral: Negative.        Physical Exam          Physical Exam  Constitutional:       Appearance: Normal appearance.   Musculoskeletal: Normal range of motion.         General: No tenderness.   Skin:     General: Skin is warm and dry.       Findings: Rash present. No erythema.      Comments: Rash upper inner right arm.  Raised , non red , hivelike bumps.  In a line.   Some scratches present.     Neurological:      Mental Status: He is alert and oriented to person, place, and time.   Psychiatric:         Mood and Affect: Mood normal.         Behavior: Behavior normal.         ED Course        Procedures                 No results found for this or any previous visit (from the past 24 hour(s)).    Medications - No data to display    Assessments & Plan (with Medical Decision Making)     I have reviewed the nursing notes.    I have reviewed the findings, diagnosis, plan and need for follow up with the patient.      New Prescriptions    PREDNISONE (DELTASONE) 20 MG TABLET    Take 1 tablet (20 mg) by mouth daily       Final diagnoses:   Contact dermatitis due to poison ivy   ASSESSMENT / PLAN:  (L23.7) Contact dermatitis due to poison ivy  Comment:   Plan:   1. Prednisone 20mg  a day for 5 days  2. 2. Caladryl or calamine lotion to area.    3. 3, May still use Benadryl for itching.        9/4/2020   [facility]     Vic Yoon NP  09/04/20 [id number]

## 2020-09-04 NOTE — DISCHARGE INSTRUCTIONS
Prednisone 20mg  a day for 5 days  2. Caladryl or calamine lotion to area.    3, May still use Benadryl for itching.

## 2020-09-04 NOTE — ED AVS SNAPSHOT
[facility]  [address]  [place] [address]  Phone:  [phone]                                    Abner Marshall   MRN: [id number]    Department:  [facility]   Date of Visit:  9/4/2020           After Visit Summary Signature Page    I have received my discharge instructions, and my questions have been answered. I have discussed any challenges I see with this plan with the nurse or doctor.    ..........................................................................................................................................  Patient/Patient Representative Signature      ..........................................................................................................................................  Patient Representative Print Name and Relationship to Patient    ..................................................               ................................................  Date                                   Time    ..........................................................................................................................................  Reviewed by Signature/Title    ...................................................              ..............................................  Date                                               Time          22EPIC Rev 08/18

## 2020-10-22 ENCOUNTER — VIRTUAL VISIT (OUTPATIENT)
Dept: FAMILY MEDICINE | Facility: OTHER | Age: 40
End: 2020-10-22
Attending: FAMILY MEDICINE
Payer: COMMERCIAL

## 2020-10-22 DIAGNOSIS — J32.9 SINUSITIS, UNSPECIFIED CHRONICITY, UNSPECIFIED LOCATION: Primary | ICD-10-CM

## 2020-10-22 PROCEDURE — 99441 PR PHYSICIAN TELEPHONE EVALUATION 5-10 MIN: CPT | Performed by: FAMILY MEDICINE

## 2020-10-22 NOTE — PROGRESS NOTES
"Abner Marshall is a 40 year old male who is being evaluated via a billable telephone visit.      The patient has been notified of following:     \"This telephone visit will be conducted via a call between you and your physician/provider. We have found that certain health care needs can be provided without the need for a physical exam.  This service lets us provide the care you need with a short phone conversation.  If a prescription is necessary we can send it directly to your pharmacy.  If lab work is needed we can place an order for that and you can then stop by our lab to have the test done at a later time.    Telephone visits are billed at different rates depending on your insurance coverage. During this emergency period, for some insurers they may be billed the same as an in-person visit.  Please reach out to your insurance provider with any questions.    If during the course of the call the physician/provider feels a telephone visit is not appropriate, you will not be charged for this service.\"    Patient has given verbal consent for Telephone visit?  Yes    What phone number would you like to be contacted at? [phone]    How would you like to obtain your AVS? declined    Subjective     Abner Marshall is a 40 year old male who presents via phone visit today for the following health issues:    HPI     RESPIRATORY SYMPTOMS      Duration: Sunday    Description  nasal congestion, facial pain/pressure, ear pain both and PND, throat clearing, cough    Severity: moderate    Accompanying signs and symptoms: None    History (predisposing factors):  none    Precipitating or alleviating factors: None    Therapies tried and outcome:  oral decongestant            Got some allergies a few weeks ago.  Now having gunky throat, pressure in head and face, slightly dizzy when he bends over, and more rhinorrhea.      Review of Systems   Constitutional, HEENT, cardiovascular, pulmonary, gi and gu systems are negative, except as " "otherwise noted.       Objective          Vitals:  No vitals were obtained today due to virtual visit.    healthy, alert and no distress  PSYCH: Alert and oriented times 3; coherent speech, normal   rate and volume, able to articulate logical thoughts, able   to abstract reason, no tangential thoughts, no hallucinations   or delusions  His affect is normal  RESP: No cough, no audible wheezing, able to talk in full sentences  Remainder of exam unable to be completed due to telephone visits            Assessment/Plan:    Assessment & Plan     Sinusitis, unspecified chronicity, unspecified location  Reviewed.  Sounds like a secondary sinus infection, as it has been in the past.  Augmentin; f/u with ongoing concerns at all.  He understands.    - amoxicillin-clavulanate (AUGMENTIN) 875-125 MG tablet; Take 1 tablet by mouth 2 times daily for 10 days     BMI:   Estimated body mass index is 30.5 kg/m  as calculated from the following:    Height as of 10/30/19: 1.905 m (6' 3\").    Weight as of 1/7/20: 110.7 kg (244 lb).                No follow-ups on file.    Brad Houser MD  [facility]    Phone call duration:  5 minutes                "

## 2021-03-10 ENCOUNTER — VIRTUAL VISIT (OUTPATIENT)
Dept: FAMILY MEDICINE | Facility: OTHER | Age: 41
End: 2021-03-10
Attending: PHYSICIAN ASSISTANT
Payer: COMMERCIAL

## 2021-03-10 DIAGNOSIS — J01.00 ACUTE MAXILLARY SINUSITIS, RECURRENCE NOT SPECIFIED: Primary | ICD-10-CM

## 2021-03-10 PROCEDURE — 99442 PR PHYSICIAN TELEPHONE EVALUATION 11-20 MIN: CPT | Performed by: PHYSICIAN ASSISTANT

## 2021-03-10 NOTE — NURSING NOTE
"Chief Complaint   Patient presents with     Sinus Problem     x 1 week       Initial There were no vitals taken for this visit. Estimated body mass index is 30.5 kg/m  as calculated from the following:    Height as of 10/30/19: 1.905 m (6' 3\").    Weight as of 1/7/20: 110.7 kg (244 lb).  Medication Reconciliation: complete  Macey Mclean LPN  "

## 2021-03-10 NOTE — PROGRESS NOTES
Abner is a 40 year old who is being evaluated via a billable telephone visit.      What phone number would you like to be contacted at? [phone]   How would you like to obtain your AVS? Don't need one    Assessment & Plan     (J01.00) Acute maxillary sinusitis, recurrence not specified  (primary encounter diagnosis)  Comment:Rest, increase fluids, Tylenol for fever or discomfort. Return to clinic if symptoms persist or worsen. Use Flonase daily  Plan: amoxicillin-clavulanate (AUGMENTIN) 875-125 MG         tablet                             No follow-ups on file.    JIM Urbina  [facility]   Abner is a 40 year old who presents for the following health issues     HPI       Acute Illness  Acute illness concerns: sinus problems  Onset/Duration: 1 week  Symptoms:  Fever: no  Chills/Sweats: no  Headache (location?): YES forehead   Sinus Pressure: YES  Conjunctivitis:  no  Ear Pain: ringing rt ear   Rhinorrhea: YES  Congestion: YES  Sore Throat: No  Cough: no  Wheeze: no  Decreased Appetite: no  Nausea: no  Vomiting: no  Diarrhea: no  Dysuria/Freq.: no  Dysuria or Hematuria: no  Fatigue/Achiness: no  Sick/Strep Exposure: no  Therapies tried and outcome: nasal rinse x2 days, flonase, claratin      Review of Systems   Constitutional, HEENT, cardiovascular, pulmonary, gi and gu systems are negative, except as otherwise noted.      Objective           Vitals:  No vitals were obtained today due to virtual visit.    Physical Exam   healthy, alert and no distress  PSYCH: Alert and oriented times 3; coherent speech, normal   rate and volume, able to articulate logical thoughts, able   to abstract reason, no tangential thoughts, no hallucinations   or delusions  His affect is normal  RESP: No cough, no audible wheezing, able to talk in full sentences  Remainder of exam unable to be completed due to telephone visits                Phone call duration: 11 minutes

## 2021-08-03 NOTE — PROGRESS NOTES
Assessment & Plan     (J01.00) Acute maxillary sinusitis, recurrence not specified  (primary encounter diagnosis)  Comment: treat with ABX. Continue Claritin and Flonase. Keep hydrated  Plan: azithromycin (ZITHROMAX Z-BERT) 250 MG tablet       (Z13.9) Screening for condition  Comment: due for cholesterol screen. Pended for future as he is not fasting today   Plan: Lipid Profile (Chol, Trig, HDL, LDL calc)               See Patient Instructions    Return if symptoms worsen or fail to improve.    Faustina Smith NP  [facility]    Baljinder Levine is a 41 year old who presents for the following health issues   HPI     Acute Illness  Acute illness concerns: sinus   Onset/Duration: July 26th   Symptoms:  Fever: no  Chills/Sweats: no  Headache (location?): YES- sinus area   Sinus Pressure: YES  Conjunctivitis:  no  Ear Pain: YES: bilateral  Rhinorrhea: no  Congestion: YES  Sore Throat: YES- started that way but hasnt had one for a week.   Cough: YES-productive of yellow sputum  Wheeze: no  Decreased Appetite: YES  Nausea: YES  Vomiting: no  Diarrhea: no  Dysuria/Freq.: no  Dysuria or Hematuria: no  Fatigue/Achiness: YES  Sick/Strep Exposure: no  Therapies tried and outcome: nasal rinse, otc decongestant. Day/night quil    Review of Systems   Constitutional, HEENT, cardiovascular, pulmonary, gi and gu systems are negative, except as otherwise noted.      Objective    /72 (BP Location: Right arm, Patient Position: Sitting, Cuff Size: Adult Large)   Pulse 68   Temp 98.7  F (37.1  C) (Tympanic)   Resp 16   Wt 106.1 kg (234 lb)   SpO2 98%   BMI 29.25 kg/m    Body mass index is 29.25 kg/m .  Physical Exam   GENERAL: healthy, alert and no distress  HENT: ear canals and TM's normal, nose and mouth without ulcers or lesions. +TTP to bilateral maxillary sinuses   NECK: no adenopathy, no asymmetry, masses, or scars and thyroid normal to palpation  RESP: lungs clear to auscultation -  no rales, rhonchi or wheezes  CV: regular rate and rhythm, normal S1 S2, no S3 or S4, no murmur, click or rub, no peripheral edema and peripheral pulses strong  MS: no gross musculoskeletal defects noted, no edema  SKIN: no suspicious lesions or rashes  NEURO: Normal strength and tone, mentation intact and speech normal  PSYCH: mentation appears normal, affect normal/bright

## 2021-08-04 ENCOUNTER — OFFICE VISIT (OUTPATIENT)
Dept: FAMILY MEDICINE | Facility: OTHER | Age: 41
End: 2021-08-04
Attending: NURSE PRACTITIONER
Payer: COMMERCIAL

## 2021-08-04 VITALS
DIASTOLIC BLOOD PRESSURE: 72 MMHG | TEMPERATURE: 98.7 F | SYSTOLIC BLOOD PRESSURE: 106 MMHG | BODY MASS INDEX: 29.25 KG/M2 | RESPIRATION RATE: 16 BRPM | OXYGEN SATURATION: 98 % | WEIGHT: 234 LBS | HEART RATE: 68 BPM

## 2021-08-04 DIAGNOSIS — J01.00 ACUTE MAXILLARY SINUSITIS, RECURRENCE NOT SPECIFIED: Primary | ICD-10-CM

## 2021-08-04 DIAGNOSIS — Z13.9 SCREENING FOR CONDITION: ICD-10-CM

## 2021-08-04 PROCEDURE — G0463 HOSPITAL OUTPT CLINIC VISIT: HCPCS

## 2021-08-04 PROCEDURE — 99213 OFFICE O/P EST LOW 20 MIN: CPT | Performed by: NURSE PRACTITIONER

## 2021-08-04 PROCEDURE — G0463 HOSPITAL OUTPT CLINIC VISIT: HCPCS | Mod: 25

## 2021-08-04 RX ORDER — AZITHROMYCIN 250 MG/1
TABLET, FILM COATED ORAL
Qty: 6 TABLET | Refills: 0 | Status: SHIPPED | OUTPATIENT
Start: 2021-08-04 | End: 2022-02-11

## 2021-08-04 ASSESSMENT — PAIN SCALES - GENERAL: PAINLEVEL: NO PAIN (0)

## 2021-08-04 NOTE — PATIENT INSTRUCTIONS
Patient Education     Sinusitis (Antibiotic Treatment)    The sinuses are air-filled spaces within the bones of the face. They connect to the inside of the nose. Sinusitis is an inflammation of the tissue that lines the sinuses. Sinusitis can occur during a cold. It can also happen due to allergies to pollens and other particles in the air. Sinusitis can cause symptoms of sinus congestion and a feeling of fullness. A sinus infection causes fever, headache, and facial pain. There is often green or yellow fluid draining from the nose or into the back of the throat (post-nasal drip). You have been given antibiotics to treat this condition.   Home care    Take the full course of antibiotics as instructed. Don't stop taking them, even when you feel better.    Drink plenty of water, hot tea, and other liquids as directed by the healthcare provider. This may help thin nasal mucus. It also may help your sinuses drain fluids.    Heat may help soothe painful areas of your face. Use a towel soaked in hot water. Or,  the shower and direct the warm spray onto your face. Using a vaporizer along with a menthol rub at night may also help soothe symptoms.     An expectorant with guaifenesin may help thin nasal mucus and help your sinuses drain fluids. Talk with your provider or pharmacists before taking an over-the-counter (OTC) medicine if you have any questions about it or its side effects..    You can use an OTC decongestant, unless a similar medicine was prescribed to you. Nasal sprays work the fastest. Use one that contains phenylephrine or oxymetazoline. First blow your nose gently. Then use the spray. Don't use these medicines more often than directed on the label. If you do, your symptoms may get worse. You may also take pills that contain pseudoephedrine. Don t use products that combine multiple medicines. This is because side effects may be increased. Read labels. You can also ask the pharmacist for help. (People  with high blood pressure should not use decongestants. They can raise blood pressure.) Talk with your provider or pharmacist if you have any questions about the medicine..    OTC antihistamines may help if allergies contributed to your sinusitis. Talk with your provider or pharmacist if you have any questions about the medicine..    Don't use nasal rinses or irrigation during an acute sinus infection, unless your healthcare provider tells you to. Rinsing may spread the infection to other areas in your sinuses.    Use acetaminophen or ibuprofen to control pain, unless another pain medicine was prescribed to you. If you have chronic liver or kidney disease or ever had a stomach ulcer, talk with your healthcare provider before using these medicines. Never give aspirin to anyone under age 18 who is ill with a fever. It may cause severe liver damage.    Don't smoke. This can make symptoms worse.    Follow-up care  Follow up with your healthcare provider, or as advised.   When to seek medical advice  Call your healthcare provider if any of these occur:     Facial pain or headache that gets worse    Stiff neck    Unusual drowsiness or confusion    Swelling of your forehead or eyelids    Symptoms don't go away in 10 days    Vision problems, such as blurred or double vision    Fever of 100.4 F (38 C) or higher, or as directed by your healthcare provider  Call 911  Call 911 if any of these occur:     Seizure    Trouble breathing    Feeling dizzy or faint    Fingernails, skin or lips look blue, purple , or gray  Prevention  Here are steps you can take to help prevent an infection:     Keep good hand washing habits.    Don t have close contact with people who have sore throats, colds, or other upper respiratory infections.    Don t smoke, and stay away from secondhand smoke.    Stay up to date with of your vaccines.  Buddy last reviewed this educational content on 12/1/2019 2000-2021 [employer]. All rights  reserved. This information is not intended as a substitute for professional medical care. Always follow your healthcare professional's instructions.

## 2021-08-04 NOTE — NURSING NOTE
"Chief Complaint   Patient presents with     URI       Initial /72 (BP Location: Right arm, Patient Position: Sitting, Cuff Size: Adult Large)   Pulse 68   Temp 98.7  F (37.1  C) (Tympanic)   Resp 16   Wt 106.1 kg (234 lb)   SpO2 98%   BMI 29.25 kg/m   Estimated body mass index is 29.25 kg/m  as calculated from the following:    Height as of 10/30/19: 1.905 m (6' 3\").    Weight as of this encounter: 106.1 kg (234 lb).  Medication Reconciliation: complete  Kari Acuna  "

## 2021-08-09 ENCOUNTER — LAB (OUTPATIENT)
Dept: LAB | Facility: OTHER | Age: 41
End: 2021-08-09
Payer: COMMERCIAL

## 2021-08-09 DIAGNOSIS — Z13.9 SCREENING FOR CONDITION: ICD-10-CM

## 2021-08-09 LAB
CHOLEST SERPL-MCNC: 165 MG/DL
FASTING STATUS PATIENT QL REPORTED: YES
HDLC SERPL-MCNC: 68 MG/DL
LDLC SERPL CALC-MCNC: 89 MG/DL
NONHDLC SERPL-MCNC: 97 MG/DL
TRIGL SERPL-MCNC: 42 MG/DL

## 2021-08-09 PROCEDURE — 80061 LIPID PANEL: CPT | Mod: ZL

## 2021-08-09 PROCEDURE — 36415 COLL VENOUS BLD VENIPUNCTURE: CPT | Mod: ZL

## 2022-02-11 ENCOUNTER — VIRTUAL VISIT (OUTPATIENT)
Dept: FAMILY MEDICINE | Facility: OTHER | Age: 42
End: 2022-02-11
Attending: NURSE PRACTITIONER
Payer: COMMERCIAL

## 2022-02-11 DIAGNOSIS — J01.00 ACUTE MAXILLARY SINUSITIS, RECURRENCE NOT SPECIFIED: Primary | ICD-10-CM

## 2022-02-11 PROCEDURE — 99441 PR PHYSICIAN TELEPHONE EVALUATION 5-10 MIN: CPT | Performed by: NURSE PRACTITIONER

## 2022-02-11 RX ORDER — AZITHROMYCIN 250 MG/1
TABLET, FILM COATED ORAL
Qty: 6 TABLET | Refills: 0 | Status: SHIPPED | OUTPATIENT
Start: 2022-02-11 | End: 2022-12-27

## 2022-02-11 NOTE — NURSING NOTE
"Chief Complaint   Patient presents with     Sinus Problem       Initial There were no vitals taken for this visit. Estimated body mass index is 29.25 kg/m  as calculated from the following:    Height as of 10/30/19: 1.905 m (6' 3\").    Weight as of 8/4/21: 106.1 kg (234 lb).  Medication Reconciliation: complete  Cate Rizo LPN  "

## 2022-02-11 NOTE — PATIENT INSTRUCTIONS
Patient Education     Sinusitis (Antibiotic Treatment)    The sinuses are air-filled spaces within the bones of the face. They connect to the inside of the nose. Sinusitis is an inflammation of the tissue that lines the sinuses. Sinusitis can occur during a cold. It can also happen due to allergies to pollens and other particles in the air. Sinusitis can cause symptoms of sinus congestion and a feeling of fullness. A sinus infection causes fever, headache, and facial pain. There is often green or yellow fluid draining from the nose or into the back of the throat (post-nasal drip). You have been given antibiotics to treat this condition.   Home care    Take the full course of antibiotics as instructed. Don't stop taking them, even when you feel better.    Drink plenty of water, hot tea, and other liquids as directed by the healthcare provider. This may help thin nasal mucus. It also may help your sinuses drain fluids.    Heat may help soothe painful areas of your face. Use a towel soaked in hot water. Or,  the shower and direct the warm spray onto your face. Using a vaporizer along with a menthol rub at night may also help soothe symptoms.     An expectorant with guaifenesin may help thin nasal mucus and help your sinuses drain fluids. Talk with your provider or pharmacists before taking an over-the-counter (OTC) medicine if you have any questions about it or its side effects..    You can use an OTC decongestant, unless a similar medicine was prescribed to you. Nasal sprays work the fastest. Use one that contains phenylephrine or oxymetazoline. First blow your nose gently. Then use the spray. Don't use these medicines more often than directed on the label. If you do, your symptoms may get worse. You may also take pills that contain pseudoephedrine. Don t use products that combine multiple medicines. This is because side effects may be increased. Read labels. You can also ask the pharmacist for help. (People  with high blood pressure should not use decongestants. They can raise blood pressure.) Talk with your provider or pharmacist if you have any questions about the medicine..    OTC antihistamines may help if allergies contributed to your sinusitis. Talk with your provider or pharmacist if you have any questions about the medicine..    Don't use nasal rinses or irrigation during an acute sinus infection, unless your healthcare provider tells you to. Rinsing may spread the infection to other areas in your sinuses.    Use acetaminophen or ibuprofen to control pain, unless another pain medicine was prescribed to you. If you have chronic liver or kidney disease or ever had a stomach ulcer, talk with your healthcare provider before using these medicines. Never give aspirin to anyone under age 18 who is ill with a fever. It may cause severe liver damage.    Don't smoke. This can make symptoms worse.    Follow-up care  Follow up with your healthcare provider, or as advised.   When to seek medical advice  Call your healthcare provider if any of these occur:     Facial pain or headache that gets worse    Stiff neck    Unusual drowsiness or confusion    Swelling of your forehead or eyelids    Symptoms don't go away in 10 days    Vision problems, such as blurred or double vision    Fever of 100.4 F (38 C) or higher, or as directed by your healthcare provider  Call 911  Call 911 if any of these occur:     Seizure    Trouble breathing    Feeling dizzy or faint    Fingernails, skin or lips look blue, purple , or gray  Prevention  Here are steps you can take to help prevent an infection:     Keep good hand washing habits.    Don t have close contact with people who have sore throats, colds, or other upper respiratory infections.    Don t smoke, and stay away from secondhand smoke.    Stay up to date with of your vaccines.  [employer] last reviewed this educational content on 12/1/2019 2000-2021 [employer]. All rights  reserved. This information is not intended as a substitute for professional medical care. Always follow your healthcare professional's instructions.

## 2022-02-11 NOTE — PROGRESS NOTES
Abner is a 41 year old who is being evaluated via a billable telephone visit.      What phone number would you like to be contacted at? [phone]  How would you like to obtain your AVS? Mail a copy    Assessment & Plan      Treat for a presumed sinus infection given his symptoms. Supportive care discussed. He will follow up if not improving.     (J01.00) Acute maxillary sinusitis, recurrence not specified  (primary encounter diagnosis)  Comment: treat with a zpack. Supportive care discussed   Plan: azithromycin (ZITHROMAX Z-BERT) 250 MG tablet          0956}  See Patient Instructions    Return if symptoms worsen or fail to improve.    Faustina Smith NP  [facility]   Abner is a 41 year old who presents for the following health issues     HPI     Acute Illness- Sinus  Acute illness concerns: Sinuses  Onset/Duration: End of January - January 24, 2022  Symptoms:  Fever: no  Chills/Sweats: no  Headache (location?): no  Sinus Pressure: YES  Conjunctivitis:  no  Ear Pain: YES: right  Rhinorrhea: YES- post nasal drip  Congestion: YES  Sore Throat: no  Cough: no  Wheeze: no  Decreased Appetite: no  Nausea: no  Vomiting: no  Diarrhea: no  Dysuria/Freq.: no  Dysuria or Hematuria: no  Fatigue/Achiness: no  Sick/Strep Exposure: YES- children tested positive for Covid in January  Therapies tried and outcome: Claritin D for six days help for that day then came back  No cough, chest pain, or SOB.       Review of Systems   Constitutional, HEENT, cardiovascular, pulmonary, gi and gu systems are negative, except as otherwise noted.      Objective       Vitals:  No vitals were obtained today due to virtual visit.    Physical Exam   healthy, alert and no distress  PSYCH: Alert and oriented times 3; coherent speech, normal   rate and volume, able to articulate logical thoughts, able   to abstract reason, no tangential thoughts, no hallucinations   or delusions  His affect is  normal  RESP: No cough, no audible wheezing, able to talk in full sentences  Remainder of exam unable to be completed due to telephone visits      Phone call duration: 5 minutes

## 2022-09-15 ENCOUNTER — HOSPITAL ENCOUNTER (EMERGENCY)
Facility: HOSPITAL | Age: 42
Discharge: HOME | End: 2022-09-15
Attending: PHYSICIAN ASSISTANT | Admitting: PHYSICIAN ASSISTANT
Payer: COMMERCIAL

## 2022-09-15 ENCOUNTER — APPOINTMENT (OUTPATIENT)
Dept: GENERAL RADIOLOGY | Facility: HOSPITAL | Age: 42
End: 2022-09-15
Attending: PHYSICIAN ASSISTANT
Payer: COMMERCIAL

## 2022-09-15 VITALS
TEMPERATURE: 98.2 F | OXYGEN SATURATION: 94 % | DIASTOLIC BLOOD PRESSURE: 109 MMHG | SYSTOLIC BLOOD PRESSURE: 141 MMHG | RESPIRATION RATE: 13 BRPM | HEART RATE: 70 BPM

## 2022-09-15 DIAGNOSIS — R10.12 LEFT UPPER QUADRANT ABDOMINAL PAIN OF UNKNOWN ETIOLOGY: ICD-10-CM

## 2022-09-15 LAB
ALBUMIN SERPL-MCNC: 3.9 G/DL (ref 3.4–5)
ALP SERPL-CCNC: 75 U/L (ref 40–150)
ALT SERPL W P-5'-P-CCNC: 59 U/L (ref 0–70)
ANION GAP SERPL CALCULATED.3IONS-SCNC: 1 MMOL/L (ref 3–14)
AST SERPL W P-5'-P-CCNC: 28 U/L (ref 0–45)
BASOPHILS # BLD AUTO: 0 10E3/UL (ref 0–0.2)
BASOPHILS NFR BLD AUTO: 1 %
BILIRUB SERPL-MCNC: 0.4 MG/DL (ref 0.2–1.3)
BUN SERPL-MCNC: 16 MG/DL (ref 7–30)
CALCIUM SERPL-MCNC: 9.5 MG/DL (ref 8.5–10.1)
CHLORIDE BLD-SCNC: 103 MMOL/L (ref 94–109)
CO2 SERPL-SCNC: 33 MMOL/L (ref 20–32)
CREAT SERPL-MCNC: 0.93 MG/DL (ref 0.66–1.25)
EOSINOPHIL # BLD AUTO: 0.1 10E3/UL (ref 0–0.7)
EOSINOPHIL NFR BLD AUTO: 3 %
ERYTHROCYTE [DISTWIDTH] IN BLOOD BY AUTOMATED COUNT: 12.1 % (ref 10–15)
GFR SERPL CREATININE-BSD FRML MDRD: >90 ML/MIN/1.73M2
GLUCOSE BLD-MCNC: 82 MG/DL (ref 70–99)
HCT VFR BLD AUTO: 45.8 % (ref 40–53)
HGB BLD-MCNC: 15.7 G/DL (ref 13.3–17.7)
HOLD SPECIMEN: NORMAL
IMM GRANULOCYTES # BLD: 0 10E3/UL
IMM GRANULOCYTES NFR BLD: 0 %
LIPASE SERPL-CCNC: 114 U/L (ref 73–393)
LYMPHOCYTES # BLD AUTO: 1.4 10E3/UL (ref 0.8–5.3)
LYMPHOCYTES NFR BLD AUTO: 25 %
MCH RBC QN AUTO: 31.2 PG (ref 26.5–33)
MCHC RBC AUTO-ENTMCNC: 34.3 G/DL (ref 31.5–36.5)
MCV RBC AUTO: 91 FL (ref 78–100)
MONOCYTES # BLD AUTO: 0.3 10E3/UL (ref 0–1.3)
MONOCYTES NFR BLD AUTO: 6 %
NEUTROPHILS # BLD AUTO: 3.6 10E3/UL (ref 1.6–8.3)
NEUTROPHILS NFR BLD AUTO: 65 %
NRBC # BLD AUTO: 0 10E3/UL
NRBC BLD AUTO-RTO: 0 /100
PLATELET # BLD AUTO: 186 10E3/UL (ref 150–450)
POTASSIUM BLD-SCNC: 3.7 MMOL/L (ref 3.4–5.3)
PROT SERPL-MCNC: 7.4 G/DL (ref 6.8–8.8)
RBC # BLD AUTO: 5.04 10E6/UL (ref 4.4–5.9)
SODIUM SERPL-SCNC: 137 MMOL/L (ref 133–144)
TROPONIN I SERPL HS-MCNC: 4 NG/L
WBC # BLD AUTO: 5.6 10E3/UL (ref 4–11)

## 2022-09-15 PROCEDURE — 85025 COMPLETE CBC W/AUTO DIFF WBC: CPT | Performed by: PHYSICIAN ASSISTANT

## 2022-09-15 PROCEDURE — 99285 EMERGENCY DEPT VISIT HI MDM: CPT | Mod: 25

## 2022-09-15 PROCEDURE — 93005 ELECTROCARDIOGRAM TRACING: CPT

## 2022-09-15 PROCEDURE — 80053 COMPREHEN METABOLIC PANEL: CPT | Performed by: PHYSICIAN ASSISTANT

## 2022-09-15 PROCEDURE — 36415 COLL VENOUS BLD VENIPUNCTURE: CPT | Performed by: PHYSICIAN ASSISTANT

## 2022-09-15 PROCEDURE — 93010 ELECTROCARDIOGRAM REPORT: CPT | Performed by: INTERNAL MEDICINE

## 2022-09-15 PROCEDURE — 84484 ASSAY OF TROPONIN QUANT: CPT | Performed by: PHYSICIAN ASSISTANT

## 2022-09-15 PROCEDURE — 71045 X-RAY EXAM CHEST 1 VIEW: CPT

## 2022-09-15 PROCEDURE — 83690 ASSAY OF LIPASE: CPT | Performed by: PHYSICIAN ASSISTANT

## 2022-09-15 PROCEDURE — C9803 HOPD COVID-19 SPEC COLLECT: HCPCS

## 2022-09-15 PROCEDURE — 250N000013 HC RX MED GY IP 250 OP 250 PS 637: Performed by: PHYSICIAN ASSISTANT

## 2022-09-15 PROCEDURE — 250N000009 HC RX 250: Performed by: PHYSICIAN ASSISTANT

## 2022-09-15 PROCEDURE — 99284 EMERGENCY DEPT VISIT MOD MDM: CPT | Performed by: PHYSICIAN ASSISTANT

## 2022-09-15 RX ADMIN — LIDOCAINE HYDROCHLORIDE 30 ML: 20 SOLUTION ORAL; TOPICAL at 14:22

## 2022-09-15 ASSESSMENT — ENCOUNTER SYMPTOMS
VOMITING: 0
SHORTNESS OF BREATH: 0
DIZZINESS: 0
FATIGUE: 0
CHEST TIGHTNESS: 0
BRUISES/BLEEDS EASILY: 0
COUGH: 0
ACTIVITY CHANGE: 0
FEVER: 0
ABDOMINAL PAIN: 1
WEAKNESS: 0
BACK PAIN: 0
NAUSEA: 0
APPETITE CHANGE: 0

## 2022-09-15 ASSESSMENT — ACTIVITIES OF DAILY LIVING (ADL): ADLS_ACUITY_SCORE: 35

## 2022-09-15 NOTE — DISCHARGE INSTRUCTIONS
As we discussed, I am not certain of ED etiology of your pain.  You can trial an outpatient proton pump inhibitor such as Prilosec or Nexium for the next 2 weeks.  Please follow-up in the clinic.  Advance her diet as tolerated.  It is very important to return to this emergency department for any return of symptoms, worsening symptoms, new symptoms, chest discomfort, shortness of breath, or other questions or concerns.

## 2022-09-15 NOTE — ED TRIAGE NOTES
"Patient presents with c/o intermittent epigastric chest pain that radiates around his heart, started at 1000. 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 Movement/activity makes chest pain worse. Patient reports HX of GERD, \"I think this is ulcer related.\" Patient reports increased burping.       "

## 2022-09-15 NOTE — ED PROVIDER NOTES
History     Chief Complaint   Patient presents with     Chest Pain     The history is provided by the patient.     Abner Marshall is a 42 year old male who presented to the emergency department ambulatory for evaluation of epigastric/left sided chest pain.  Pain began approximately 10:00 this morning.  He was working in his garage and lifting over his head.  Began to experience pain and he said approximately 15 minutes after the pain started the pain was described as an 8.  Mostly in the left upper abdomen/left lower chest.  No dyspnea, diaphoresis, or vomiting with the pain.  No history of coronary disease.  He does not of a primary care provider and takes no medications.  Currently describes the pain as a 2.  He has had no intra-abdominal surgeries.    Allergies:  Allergies   Allergen Reactions     Mold      Seasonal Allergies        Problem List:    Patient Active Problem List    Diagnosis Date Noted     ACP (advance care planning) 04/05/2017     Priority: Medium     Advance Care Planning 4/5/2017: ACP Review of Chart / Resources Provided:  Reviewed chart for advance care plan.  Abner Marshall has no plan or code status on file. Discussed available resources and provided with information.   Added by Roma Pena                Past Medical History:    Past Medical History:   Diagnosis Date     COVID 10/2021       Past Surgical History:    Past Surgical History:   Procedure Laterality Date     VASECTOMY  2011       Family History:    Family History   Problem Relation Age of Onset     Obesity Mother      Ear Disorder Mother         Chronic ear disease       Social History:  Marital Status:   [2]  Social History     Tobacco Use     Smoking status: Never Smoker     Smokeless tobacco: Never Used   Substance Use Topics     Alcohol use: No     Drug use: No        Medications:    Loratadine (CLARITIN PO)  azithromycin (ZITHROMAX Z-BERT) 250 MG tablet          Review of Systems   Constitutional:  Negative for activity change, appetite change, fatigue and fever.   Respiratory: Negative for cough, chest tightness and shortness of breath.    Cardiovascular: Positive for chest pain.   Gastrointestinal: Positive for abdominal pain. Negative for nausea and vomiting.   Genitourinary: Negative.    Musculoskeletal: Negative for back pain.   Skin: Negative.    Neurological: Negative for dizziness and weakness.   Hematological: Does not bruise/bleed easily.       Physical Exam   BP: 136/80  Pulse: 78  Temp: 98.2  F (36.8  C)  Resp: 18  SpO2: 99 %      Physical Exam  Vitals and nursing note reviewed.   Constitutional:       General: He is not in acute distress.     Appearance: Normal appearance. He is normal weight. He is not ill-appearing, toxic-appearing or diaphoretic.   Cardiovascular:      Rate and Rhythm: Normal rate and regular rhythm.   Pulmonary:      Effort: Pulmonary effort is normal.      Breath sounds: Normal breath sounds.   Abdominal:      General: There is no distension.      Palpations: Abdomen is soft.      Tenderness: There is no abdominal tenderness. There is no guarding.       Skin:     General: Skin is warm and dry.      Capillary Refill: Capillary refill takes less than 2 seconds.   Neurological:      General: No focal deficit present.      Mental Status: He is alert and oriented to person, place, and time.   Psychiatric:         Mood and Affect: Mood normal.         Behavior: Behavior normal.         ED Course              ED Course as of 09/15/22 1452   Thu Sep 15, 2022   1332 EKG shows a normal sinus rhythm at a rate of 68.  Normal [place] interval.  Normal QRS duration.  Normal QTC.  Normal axis.  There are no concerning ST segments.  There are no concerning T waves.  There is no evidence of ectopy, preexcitation, or ischemia.  There is J-point notching widespread consistent with early benign repolarization.     Procedures              Critical Care time:  none               Results for orders  placed or performed during the hospital encounter of 09/15/22 (from the past 24 hour(s))   CBC with platelets differential    Narrative    The following orders were created for panel order CBC with platelets differential.  Procedure                               Abnormality         Status                     ---------                               -----------         ------                     CBC with platelets and d...[[id number]]                      Final result                 Please view results for these tests on the individual orders.   Lipase   Result Value Ref Range    Lipase 114 73 - 393 U/L   Comprehensive metabolic panel   Result Value Ref Range    Sodium 137 133 - 144 mmol/L    Potassium 3.7 3.4 - 5.3 mmol/L    Chloride 103 94 - 109 mmol/L    Carbon Dioxide (CO2) 33 (H) 20 - 32 mmol/L    Anion Gap 1 (L) 3 - 14 mmol/L    Urea Nitrogen 16 7 - 30 mg/dL    Creatinine 0.93 0.66 - 1.25 mg/dL    Calcium 9.5 8.5 - 10.1 mg/dL    Glucose 82 70 - 99 mg/dL    Alkaline Phosphatase 75 40 - 150 U/L    AST 28 0 - 45 U/L    ALT 59 0 - 70 U/L    Protein Total 7.4 6.8 - 8.8 g/dL    Albumin 3.9 3.4 - 5.0 g/dL    Bilirubin Total 0.4 0.2 - 1.3 mg/dL    GFR Estimate >90 >60 mL/min/1.73m2   Troponin I   Result Value Ref Range    Troponin I High Sensitivity 4 <79 ng/L   CBC with platelets and differential   Result Value Ref Range    WBC Count 5.6 4.0 - 11.0 10e3/uL    RBC Count 5.04 4.40 - 5.90 10e6/uL    Hemoglobin 15.7 13.3 - 17.7 g/dL    Hematocrit 45.8 40.0 - 53.0 %    MCV 91 78 - 100 fL    MCH 31.2 26.5 - 33.0 pg    MCHC 34.3 31.5 - 36.5 g/dL    RDW 12.1 10.0 - 15.0 %    Platelet Count 186 150 - 450 10e3/uL    % Neutrophils 65 %    % Lymphocytes 25 %    % Monocytes 6 %    % Eosinophils 3 %    % Basophils 1 %    % Immature Granulocytes 0 %    NRBCs per 100 WBC 0 <1 /100    Absolute Neutrophils 3.6 1.6 - 8.3 10e3/uL    Absolute Lymphocytes 1.4 0.8 - 5.3 10e3/uL    Absolute Monocytes 0.3 0.0 - 1.3 10e3/uL    Absolute  "Eosinophils 0.1 0.0 - 0.7 10e3/uL    Absolute Basophils 0.0 0.0 - 0.2 10e3/uL    Absolute Immature Granulocytes 0.0 <=0.4 10e3/uL    Absolute NRBCs 0.0 10e3/uL   [place] Draw    Narrative    The following orders were created for panel order [place] Draw.  Procedure                               Abnormality         Status                     ---------                               -----------         ------                     Extra Blue Top Tube[[id number]]                              Final result               Extra Red Top Tube[[id number]]                               Final result               Extra Green Top (Lithium...[[id number]]                      Final result                 Please view results for these tests on the individual orders.   Extra Blue Top Tube   Result Value Ref Range    Hold Specimen OK    Extra Red Top Tube   Result Value Ref Range    Hold Specimen OK    Extra Green Top (Lithium Heparin) Tube   Result Value Ref Range    Hold Specimen OK    XR Chest Port 1 View    Narrative    PROCEDURE: XR CHEST PORT 1 VIEW 9/15/2022 2:24 PM    HISTORY: Chest pain    COMPARISONS: None.    TECHNIQUE: Single portable view.    FINDINGS: Heart and pulmonary vasculature are normal. Lungs are clear  and no pleural effusion is seen.         Impression    IMPRESSION: No acute infiltrate.    HAROON PERERA MD         SYSTEM ID:  [id number]       Medications   lidocaine (viscous) (XYLOCAINE) 2 % 15 mL, alum & mag hydroxide-simethicone (MAALOX) 15 mL GI Cocktail (30 mLs Oral Given 9/15/22 1422)       Assessments & Plan (with Medical Decision Making)   Findings as above.  Symptoms virtually resolved in the emergency department without therapy.  The patient had some lingering mild discomfort that resolved with GI cocktail.  We do long detailed discussion.  I recommended repeat troponin to delta trend and the patient declined.  He reports \"I did not come in here thinking it was my heart.\"  His abdomen is soft.  " Laboratory evaluation as above.  Pain is currently a 0.  Differential is broad including any number of thoracic and intra-abdominal etiologies.  However, at this time I do not believe is experiencing an emergent etiology.  HPI, exam, symptoms, and work-up are not consistent with acute mesenteric ischemia, small bowel obstruction, aortic dissection, acute pancreatitis, acute appendicitis, acute cholecystitis, acute coronary syndrome, pulmonary embolism, thoracic aortic dissection, pneumonia, or other catastrophic etiology.  He is pleasant and talkative.  Looks well.  Vital signs within normal limits.  Oxygen saturation is 99% on room air.  Pain is 0.  The pain was not described as abrupt in onset nor maximal intensity at onset.  Was not described as ripping or tearing.  He like to be discharged home.  I did stress the importance of close follow-up in the clinic.  I also stressed the importance of him returning to this emergency department for any new or worsening symptoms.  The patient voiced understanding and was agreeable.    This document was prepared using a combination of typing and voice generated software.  While every attempt was made for accuracy, spelling and grammatical errors may exist.    I have reviewed the nursing notes.    I have reviewed the findings, diagnosis, plan and need for follow up with the patient.       New Prescriptions    No medications on file       Final diagnoses:   Left upper quadrant abdominal pain of unknown etiology       9/15/2022   [facility]     Eb Alegre PA-C  09/15/22 1901

## 2022-09-15 NOTE — ED NOTES
"Pt reporting new chest pain, stated it felt like heart burn but felt different than his normal. Around 1000 pt was \"bent over\" in pain, starts in epigastric area and radiates to left side of chest. Stated he took antacids which decreased pain but had a more delayed onset than normal. Currently decreased to 3/10. Denies any SOB or dyspnea.   "

## 2022-09-15 NOTE — ED NOTES
Pt ambulatory at discharge. Pt understanding of discharge instructions, agrees to return to ED if symptoms or other concerns develop.

## 2022-12-27 ENCOUNTER — OFFICE VISIT (OUTPATIENT)
Dept: FAMILY MEDICINE | Facility: OTHER | Age: 42
End: 2022-12-27
Attending: FAMILY MEDICINE
Payer: COMMERCIAL

## 2022-12-27 ENCOUNTER — TELEPHONE (OUTPATIENT)
Dept: FAMILY MEDICINE | Facility: OTHER | Age: 42
End: 2022-12-27

## 2022-12-27 VITALS
DIASTOLIC BLOOD PRESSURE: 64 MMHG | SYSTOLIC BLOOD PRESSURE: 108 MMHG | HEART RATE: 78 BPM | WEIGHT: 229 LBS | BODY MASS INDEX: 28.62 KG/M2 | TEMPERATURE: 97.2 F | OXYGEN SATURATION: 98 %

## 2022-12-27 DIAGNOSIS — J01.00 ACUTE MAXILLARY SINUSITIS, RECURRENCE NOT SPECIFIED: ICD-10-CM

## 2022-12-27 PROCEDURE — 99213 OFFICE O/P EST LOW 20 MIN: CPT | Performed by: FAMILY MEDICINE

## 2022-12-27 PROCEDURE — G0463 HOSPITAL OUTPT CLINIC VISIT: HCPCS

## 2022-12-27 RX ORDER — AZITHROMYCIN 250 MG/1
TABLET, FILM COATED ORAL
Qty: 6 TABLET | Refills: 0 | Status: SHIPPED | OUTPATIENT
Start: 2022-12-27 | End: 2023-04-04

## 2022-12-27 ASSESSMENT — PAIN SCALES - GENERAL: PAINLEVEL: NO PAIN (0)

## 2022-12-27 NOTE — PROGRESS NOTES
Assessment & Plan     Acute maxillary sinusitis, recurrence not specified  Several weeks now of worsening sx.  He is considering starting the allergy shots.  This works for him when he gets an infection.  zpack and follow.     - azithromycin (ZITHROMAX Z-BERT) 250 MG tablet; Two tablets on the first day, then one tablet daily for the next 4 days                 No follow-ups on file.    Brad Houser MD  [facility]   Abner is a 42 year old, presenting for the following health issues:  Sinus Problem (/)      HPI     Sinus problem       Duration: 1 month     Description (location/character/radiation): sinus     Intensity:  moderate    Accompanying signs and symptoms: facial pain/pressure, dizzy, headaches, nasal congestion     History (similar episodes/previous evaluation): None    Precipitating or alleviating factors: None    Therapies tried and outcome: Claritin D            Review of Systems   Constitutional, HEENT, cardiovascular, pulmonary, gi and gu systems are negative, except as otherwise noted.      Objective    /64   Pulse 78   Temp 97.2  F (36.2  C) (Tympanic)   Wt 103.9 kg (229 lb)   SpO2 98%   BMI 28.62 kg/m    Body mass index is 28.62 kg/m .  Physical Exam   GENERAL: healthy, alert and no distress  EYES: Eyes grossly normal to inspection, PERRL and conjunctivae and sclerae normal  HENT: ear canals and TM's normal, nose and mouth without ulcers or lesions  NECK: no adenopathy, no asymmetry, masses, or scars and thyroid normal to palpation  RESP: lungs clear to auscultation - no rales, rhonchi or wheezes  CV: regular rate and rhythm, normal S1 S2, no S3 or S4, no murmur, click or rub, no peripheral edema and peripheral pulses strong  ABDOMEN: soft, nontender, no hepatosplenomegaly, no masses and bowel sounds normal  MS: no gross musculoskeletal defects noted, no edema

## 2022-12-27 NOTE — TELEPHONE ENCOUNTER
8:33 AM    Reason for Call: OVERBOOK    Patient is having the following symptoms: Sinus infection for 3 weeks    The patient is requesting an appointment for today  with  Dr. Houser    Was an appointment offered for this call? No  If yes : Appointment type              Date    Preferred method for responding to this message: Telephone Call  What is your phone number ?  [phone]    If we cannot reach you directly, may we leave a detailed response at the number you provided? Yes    Can this message wait until your PCP/provider returns, if unavailable today? Not applicable,     pSring Kapadia

## 2023-04-04 ENCOUNTER — VIRTUAL VISIT (OUTPATIENT)
Dept: FAMILY MEDICINE | Facility: OTHER | Age: 43
End: 2023-04-04
Attending: NURSE PRACTITIONER
Payer: COMMERCIAL

## 2023-04-04 DIAGNOSIS — J01.00 ACUTE MAXILLARY SINUSITIS, RECURRENCE NOT SPECIFIED: Primary | ICD-10-CM

## 2023-04-04 PROCEDURE — 99212 OFFICE O/P EST SF 10 MIN: CPT | Mod: 93 | Performed by: NURSE PRACTITIONER

## 2023-04-04 RX ORDER — AZITHROMYCIN 250 MG/1
TABLET, FILM COATED ORAL
Qty: 6 TABLET | Refills: 0 | Status: SHIPPED | OUTPATIENT
Start: 2023-04-04 | End: 2024-03-11

## 2023-04-04 NOTE — PROGRESS NOTES
Abner is a 42 year old who is being evaluated via a billable telephone visit.      What phone number would you like to be contacted at? [phone]  How would you like to obtain your AVS?   Distant Location (provider location):  On-site    Assessment & Plan     (J01.00) Acute maxillary sinusitis, recurrence not specified  (primary encounter diagnosis)  Comment: treat with antibiotics. Continue Claritin. Add Flonase. Keep hydrated. Supportive care discussed   Plan: azithromycin (ZITHROMAX Z-BERT) 250 MG tablet             See Patient Instructions    Return if symptoms worsen or fail to improve.    PHUONG Benz [facility]   Abner is a 42 year old, presenting for the following health issues:    Sinus Problem      HPI     Acute Illness  Acute illness concerns: Sinus infections   Onset/Duration: 2-3 weeks   Symptoms:  Fever: No  Chills/Sweats: No  Headache (location?): YES, frontal   Sinus Pressure: YES  Conjunctivitis:  YES  Ear Pain: YES: left  Rhinorrhea: YES  Congestion: YES  Sore Throat: No  Cough: no  Wheeze: No  Decreased Appetite: No  Nausea: No  Vomiting: No  Diarrhea: No  Dysuria/Freq.: No  Dysuria or Hematuria: No  Fatigue/Achiness: No  Sick/Strep Exposure: No  Therapies tried and outcome: Nasal rinses, Claritin      Denies any neck symptoms.     Feels like a typical sinus infection that he's had in the past.       Review of Systems   Constitutional, HEENT, cardiovascular, pulmonary, gi and gu systems are negative, except as otherwise noted.      Objective         Vitals:  No vitals were obtained today due to virtual visit.    Physical Exam   healthy, alert and no distress  PSYCH: Alert and oriented times 3; coherent speech, normal   rate and volume, able to articulate logical thoughts, able   to abstract reason, no tangential thoughts, no hallucinations   or delusions  His affect is normal  RESP: No cough, no audible wheezing, able to talk in full  sentences  Remainder of exam unable to be completed due to telephone visits      Phone call duration: 5 Minutes  Start: 1537  End: 1542

## 2024-03-11 ENCOUNTER — OFFICE VISIT (OUTPATIENT)
Dept: FAMILY MEDICINE | Facility: OTHER | Age: 44
End: 2024-03-11
Attending: STUDENT IN AN ORGANIZED HEALTH CARE EDUCATION/TRAINING PROGRAM
Payer: COMMERCIAL

## 2024-03-11 VITALS
OXYGEN SATURATION: 97 % | TEMPERATURE: 97.4 F | HEIGHT: 75 IN | WEIGHT: 239.9 LBS | BODY MASS INDEX: 29.83 KG/M2 | SYSTOLIC BLOOD PRESSURE: 132 MMHG | DIASTOLIC BLOOD PRESSURE: 70 MMHG | HEART RATE: 68 BPM

## 2024-03-11 DIAGNOSIS — J01.01 ACUTE RECURRENT MAXILLARY SINUSITIS: Primary | ICD-10-CM

## 2024-03-11 PROCEDURE — G0463 HOSPITAL OUTPT CLINIC VISIT: HCPCS

## 2024-03-11 PROCEDURE — 99213 OFFICE O/P EST LOW 20 MIN: CPT | Performed by: STUDENT IN AN ORGANIZED HEALTH CARE EDUCATION/TRAINING PROGRAM

## 2024-03-11 RX ORDER — AZITHROMYCIN 250 MG/1
TABLET, FILM COATED ORAL
Qty: 6 TABLET | Refills: 0 | Status: SHIPPED | OUTPATIENT
Start: 2024-03-11

## 2024-03-11 ASSESSMENT — PAIN SCALES - GENERAL: PAINLEVEL: NO PAIN (0)

## 2024-03-11 NOTE — PROGRESS NOTES
"  Assessment & Plan     Acute recurrent maxillary sinusitis  Classic viral syndrome about 10 days ago with temporary improvement but then relapse of symptoms, predominantly dealing with sinus congestion and pressure, some lingering cough.  Exam and vitals reassuring, hold off on XR/lab screening.  Does have history of sinus infections responsive to Z-Andrew and will try to avoid amoxicillin with recent GI symptoms.  May need to escalate therapy if not improving in 1 week.  Continue supportive cares.  - azithromycin (ZITHROMAX) 250 MG tablet; As directed. Take 2 tablets on first day, then take 1 tablet daily for 4 days.  - Nasacort, Claritin, Robitussin  - Nasal saline rinses, humidified air, warm showers, rest, hydration    I spent a total of 24 minutes on the day of the visit.   Time spent by me doing chart review, history and exam, documentation and further activities per the note      BMI  Estimated body mass index is 29.99 kg/m  as calculated from the following:    Height as of this encounter: 1.905 m (6' 3\").    Weight as of this encounter: 108.8 kg (239 lb 14.4 oz).   Weight management plan: Did not discuss today.    Follow-up if not improving.    Baljinder Levine is a 43 year old, presenting for the following health issues:  Cold Symptoms        3/11/2024    10:29 AM   Additional Questions   Roomed by Cheyenne FELIPE     Acute Illness  Acute illness concerns:   Onset/Duration: since 3/1/24  Symptoms:  Fever: YES  Chills/Sweats: YES  Headache (location?): YES  Sinus Pressure: YES  Conjunctivitis:  YES  Ear Pain: no  Rhinorrhea: YES  Congestion: YES  Sore Throat: YES  Cough: YES  Wheeze: YES  Decreased Appetite: YES  Nausea: YES  Vomiting: No  Diarrhea: No  Dysuria/Freq.: No  Dysuria or Hematuria: No  Fatigue/Achiness: YES  Sick/Strep Exposure: No  Therapies tried and outcome: OTC medication    -Started about 10 days ago with 3 to 4 days of pretty severe influenza type symptoms  -Started getting better on " "day 5 or 6  -Symptoms seem to relapse over the past 3 to 4 days  -A lot of lingering sinus congestion, pressure, some bloody nasal discharge  -Ongoing coughing fits, never feels like he can clear the phlegm  -Similar to prior sinus infections, cough may be a bit more intense than usual  -Using Nasacort, Claritin, Delsym made him have upset stomach  -Had some upset stomach after some ibuprofen and other OTC meds - this is improving    -Daughter was sick prior to onset of his illness with unspecified respiratory infection    Review of Systems  Constitutional, HEENT, cardiovascular, pulmonary, gi and gu systems are negative, except as otherwise noted.      Objective    /70 (BP Location: Right arm, Patient Position: Sitting, Cuff Size: Adult Regular)   Pulse 68   Temp 97.4  F (36.3  C) (Tympanic)   Ht 1.905 m (6' 3\")   Wt 108.8 kg (239 lb 14.4 oz)   SpO2 97%   BMI 29.99 kg/m    Body mass index is 29.99 kg/m .  Physical Exam  Vitals reviewed.   Constitutional:       Appearance: Normal appearance.   HENT:      Head: Normocephalic and atraumatic.      Right Ear: Tympanic membrane, ear canal and external ear normal.      Left Ear: Tympanic membrane, ear canal and external ear normal.      Nose: Congestion present.      Right Sinus: Maxillary sinus tenderness present.      Left Sinus: Maxillary sinus tenderness present.      Mouth/Throat:      Mouth: Mucous membranes are moist.      Pharynx: No oropharyngeal exudate or posterior oropharyngeal erythema.   Eyes:      Conjunctiva/sclera: Conjunctivae normal.   Cardiovascular:      Rate and Rhythm: Normal rate and regular rhythm.      Heart sounds: No murmur heard.  Pulmonary:      Effort: Pulmonary effort is normal.      Breath sounds: Normal breath sounds. No stridor. No wheezing, rhonchi or rales.   Musculoskeletal:         General: Normal range of motion.      Cervical back: Normal range of motion and neck supple.   Lymphadenopathy:      Cervical: No cervical " adenopathy.   Skin:     General: Skin is warm and dry.   Neurological:      General: No focal deficit present.      Mental Status: He is alert and oriented to person, place, and time.   Psychiatric:         Mood and Affect: Mood normal.         Behavior: Behavior normal.        Signed Electronically by: Win Dee MD

## 2024-08-04 ENCOUNTER — HOSPITAL ENCOUNTER (EMERGENCY)
Facility: HOSPITAL | Age: 44
Discharge: HOME | End: 2024-08-04
Attending: PHYSICIAN ASSISTANT | Admitting: PHYSICIAN ASSISTANT

## 2024-08-04 VITALS
RESPIRATION RATE: 18 BRPM | WEIGHT: 230 LBS | HEART RATE: 61 BPM | SYSTOLIC BLOOD PRESSURE: 141 MMHG | OXYGEN SATURATION: 98 % | BODY MASS INDEX: 28.75 KG/M2 | DIASTOLIC BLOOD PRESSURE: 88 MMHG | TEMPERATURE: 97.2 F

## 2024-08-04 DIAGNOSIS — M79.5 FOREIGN BODY (FB) IN SOFT TISSUE: ICD-10-CM

## 2024-08-04 PROCEDURE — 99213 OFFICE O/P EST LOW 20 MIN: CPT | Performed by: PHYSICIAN ASSISTANT

## 2024-08-04 PROCEDURE — 250N000009 HC RX 250: Performed by: PHYSICIAN ASSISTANT

## 2024-08-04 PROCEDURE — G0463 HOSPITAL OUTPT CLINIC VISIT: HCPCS

## 2024-08-04 RX ADMIN — LIDOCAINE HYDROCHLORIDE 10 ML: 10 INJECTION, SOLUTION INFILTRATION; PERINEURAL at 20:14

## 2024-08-04 ASSESSMENT — ENCOUNTER SYMPTOMS: WOUND: 1

## 2024-08-04 ASSESSMENT — ACTIVITIES OF DAILY LIVING (ADL): ADLS_ACUITY_SCORE: 35

## 2024-08-05 NOTE — ED TRIAGE NOTES
Pt presents with c/o having a fish hook to the right pointer finger   Last TDAP was in 2019  States happened around 1800  No otc meds taken today

## 2024-08-05 NOTE — ED PROVIDER NOTES
History     Chief Complaint   Patient presents with    Foreign Body in Skin     HPI  Abner Marshall is a 44 year old male who presents to urgent care with foreign body in right index finger.  Patient states he accidentally got a fishhook stuck in his right index finger.  Patient's last tetanus was in 2019.  Bleeding is controlled.    Allergies:  Allergies   Allergen Reactions    Mold     Seasonal Allergies        Problem List:    Patient Active Problem List    Diagnosis Date Noted    ACP (advance care planning) 04/05/2017     Priority: Medium     Advance Care Planning 4/5/2017: ACP Review of Chart / Resources Provided:  Reviewed chart for advance care plan.  Abner Marshall has no plan or code status on file. Discussed available resources and provided with information.   Added by Roma Pena                Past Medical History:    Past Medical History:   Diagnosis Date    COVID 10/2021       Past Surgical History:    Past Surgical History:   Procedure Laterality Date    VASECTOMY  2011       Family History:    Family History   Problem Relation Age of Onset    Obesity Mother     Ear Disorder Mother         Chronic ear disease       Social History:  Marital Status:   [2]  Social History     Tobacco Use    Smoking status: Never     Passive exposure: Never    Smokeless tobacco: Never   Vaping Use    Vaping status: Never Used   Substance Use Topics    Alcohol use: No    Drug use: No        Medications:    azithromycin (ZITHROMAX) 250 MG tablet  Loratadine (CLARITIN PO)          Review of Systems   Skin:  Positive for wound.   All other systems reviewed and are negative.      Physical Exam   BP: 141/88  Pulse: 61  Temp: 97.2  F (36.2  C)  Resp: 18  Weight: 104.3 kg (230 lb)  SpO2: 98 %      Physical Exam  Vitals and nursing note reviewed.   Constitutional:       General: He is not in acute distress.     Appearance: Normal appearance. He is not ill-appearing or toxic-appearing.   Cardiovascular:       Rate and Rhythm: Regular rhythm.      Heart sounds: Normal heart sounds.   Pulmonary:      Breath sounds: Normal breath sounds.   Skin:     Comments: Patient has metal hook present in distal tip, radial aspect of right index finger at the nail edge.   Neurological:      Mental Status: He is alert and oriented to person, place, and time.         ED Course        [facility]    Foreign Body Removal    Date/Time: 8/4/2024 8:25 PM    Performed by: Godfrey Bronson PA-C  Authorized by: Godfrey Bronson PA-C    Risks, benefits and alternatives discussed.      LOCATION     Location:  Finger    Finger location:  R index finger    Depth:  Intradermal    Tendon involvement:  None    PRE-PROCEDURE DETAILS     Imaging:  None    Neurovascular status: intact    ANESTHESIA (see MAR for exact dosages)     Anesthesia method:  Local infiltration    Local anesthetic:  Lidocaine 1% w/o epi  PROCEDURE TYPE     Procedure complexity:  Simple    POST-PROCEDURE DETAILS     Neurovascular status: intact      Post-procedure assessment: Intact fishhook removed from right index finger.    Skin closure:  None    Dressing:  Antibiotic ointment    PROCEDURE  Describe Procedure: Patient had intact fishhook removed from distal tip of right index finger.  Minimal/scant blood loss patient's finger was then soaked in warm water and Hibiclens for approximately 10 minutes.  Patient then had antibiotic ointment applied along with adhesive bandage.               Critical Care time:               No results found for this or any previous visit (from the past 24 hour(s)).    Medications   lidocaine 1 % 10 mL (10 mLs Intradermal $Given 8/4/24 2014)       Assessments & Plan (with Medical Decision Making)   Foreign body, right index finger      Discussed exam findings with patient.  Patient had fishhook successfully removed from right index finger today in urgent care; please refer to procedure note.  Patient declines tetanus updating.  Wound care  is discussed at length with patient.  Any sign of infection he is to return to emergency department immediately.  Patient verbalized understanding and agreement to plan.      I have reviewed the nursing notes.    I have reviewed the findings, diagnosis, plan and need for follow up with the patient.              New Prescriptions    No medications on file       Final diagnoses:   Foreign body (FB) in soft tissue       8/4/2024   [facility]       Godfrey Bronson PA-C  08/04/24 2032

## 2025-02-09 ENCOUNTER — HEALTH MAINTENANCE LETTER (OUTPATIENT)
Age: 45
End: 2025-02-09